# Patient Record
Sex: FEMALE | Race: OTHER | Employment: OTHER | URBAN - METROPOLITAN AREA
[De-identification: names, ages, dates, MRNs, and addresses within clinical notes are randomized per-mention and may not be internally consistent; named-entity substitution may affect disease eponyms.]

---

## 2017-01-27 ENCOUNTER — OFFICE VISIT (OUTPATIENT)
Dept: INTERNAL MEDICINE CLINIC | Facility: CLINIC | Age: 67
End: 2017-01-27

## 2017-01-27 VITALS
HEART RATE: 59 BPM | BODY MASS INDEX: 23.19 KG/M2 | DIASTOLIC BLOOD PRESSURE: 78 MMHG | OXYGEN SATURATION: 98 % | HEIGHT: 61.75 IN | RESPIRATION RATE: 16 BRPM | TEMPERATURE: 98 F | SYSTOLIC BLOOD PRESSURE: 122 MMHG | WEIGHT: 126 LBS

## 2017-01-27 DIAGNOSIS — R00.1 BRADYCARDIA: ICD-10-CM

## 2017-01-27 DIAGNOSIS — Z12.11 SCREEN FOR COLON CANCER: ICD-10-CM

## 2017-01-27 DIAGNOSIS — E04.1 THYROID NODULE: ICD-10-CM

## 2017-01-27 DIAGNOSIS — H26.9 CATARACT, UNSPECIFIED CATARACT TYPE, UNSPECIFIED LATERALITY: ICD-10-CM

## 2017-01-27 DIAGNOSIS — Z00.00 ENCOUNTER FOR ANNUAL HEALTH EXAMINATION: Primary | ICD-10-CM

## 2017-01-27 PROCEDURE — 96160 PT-FOCUSED HLTH RISK ASSMT: CPT | Performed by: INTERNAL MEDICINE

## 2017-01-27 NOTE — PROGRESS NOTES
HPI:   Faviloa Agee is a 78year old female who presents for a MA (Medicare Advantage) [facility] (Once per calendar year). Patient has no major complaints. She is scheduled for vacation , leaving for [place] and [place].  Will be ileana abdominal pain, denies heartburn  : denies dysuria, vaginal discharge or itching, no complaint of urinary incontinence   MUSCULOSKELETAL: occasional arthralgia especially after dancing  NEURO: denies headaches  PSYCHE: denies depression or anxiety  HEMAT Pulses: 2+ and symmetric   Skin: Skin color, texture, turgor normal, no rashes or lesions   Lymph nodes: Cervical, supraclavicular, and axillary nodes normal   Neurologic: Normal       SUGGESTED VACCINATIONS - Influenza, Pneumococcal, Zoster, Tetanus MD [place], 1/27/2017     General Health     In the past six months, have you lost more than 10 pounds without trying?: 2 - No    Has your appetite been poor?: No    How does the patient maintain a good energy level?: Appropriate Exercise    How would you desc down, depressed, or hopeless (over the last two weeks)?: Not at all    PHQ-2 SCORE: 0        Advance Directives     Do you have a healthcare power of ?: No    Do you have a living will?: No     Please go to \"Cognitive Assessment\" under Medicare A as discussed Heber Logan due on 07/27/2017 Update Health Maintenance if applicable     Immunizations      Influenza refused Update Immunization Activity if applicable    Pneumoccocal 13 (Prevnar) refused     Pneumococcal 23 (Pneumovax) refused     Hepat

## 2017-01-27 NOTE — PATIENT INSTRUCTIONS
Clyde Rios's SCREENING SCHEDULE   Tests on this list are recommended by your physician but may not be covered, or covered at this frequency, by your insurer. Please check with your insurance carrier before scheduling to verify coverage.    PREVENTATI every 5 years No results found for this or any previous visit. No flowsheet data found. Fecal Occult Blood   Covered Annually No results found for: FOB, OCCULTSTOOL No flowsheet data found.      Barium Enema-   uncomfortable but covered  Covered but unc Moderate/High Risk       No orders found for this or any previous visit.  Medium/high risk factors:   End-stage renal disease   Hemophiliacs who received Factor VIII or IX concentrates   Clients of institutions for the mentally retarded   Persons who live i Fasting Blood Sugar (FSB)   Patient must be diagnosed with one of the following:   • Hypertension   • Dyslipidemia   • Obesity (BMI ³30 kg/m2)   • Previous elevated impaired FBS or GTT   … or any two of the following:   • Overweight (BMI ³25 but <30)   • F schedule if you are in this risk group, make sure you have a referral   Bone Density Screening      Bone density screening   Covered every 2 yrs after age 72    Covered yearly for Long term Glucocorticoid medication (Steroids) Requires diagnosis related to previous visit. This may be covered with your prescription benefits, but Medicare does not cover unless Medically needed    Zoster (Not covered by Medicare Part B) No orders found for this or any previous visit.  This may be covered with your pharmacy  pres

## 2017-02-01 ENCOUNTER — NURSE ONLY (OUTPATIENT)
Dept: INTERNAL MEDICINE CLINIC | Facility: CLINIC | Age: 67
End: 2017-02-01

## 2017-02-01 DIAGNOSIS — H02.88B MEIBOMIAN GLAND DYSFUNCTION (MGD), BILATERAL, BOTH UPPER AND LOWER LIDS: ICD-10-CM

## 2017-02-01 DIAGNOSIS — R00.1 BRADYCARDIA: Primary | ICD-10-CM

## 2017-02-01 DIAGNOSIS — Z00.00 ENCOUNTER FOR ANNUAL HEALTH EXAMINATION: ICD-10-CM

## 2017-02-01 DIAGNOSIS — E04.1 THYROID NODULE: ICD-10-CM

## 2017-02-01 DIAGNOSIS — H02.88A MEIBOMIAN GLAND DYSFUNCTION (MGD), BILATERAL, BOTH UPPER AND LOWER LIDS: ICD-10-CM

## 2017-02-01 LAB
ALBUMIN SERPL BCP-MCNC: 3.9 G/DL (ref 3.5–4.8)
ALBUMIN/GLOB SERPL: 1.5 {RATIO} (ref 1–2)
ALP SERPL-CCNC: 40 U/L (ref 32–100)
ALT SERPL-CCNC: 14 U/L (ref 14–54)
ANION GAP SERPL CALC-SCNC: 5 MMOL/L (ref 0–18)
AST SERPL-CCNC: 19 U/L (ref 15–41)
BASOPHILS # BLD: 0 K/UL (ref 0–0.2)
BASOPHILS NFR BLD: 1 %
BILIRUB SERPL-MCNC: 0.9 MG/DL (ref 0.3–1.2)
BUN SERPL-MCNC: 14 MG/DL (ref 8–20)
BUN/CREAT SERPL: 21.2 (ref 10–20)
CALCIUM SERPL-MCNC: 9.3 MG/DL (ref 8.5–10.5)
CHLORIDE SERPL-SCNC: 107 MMOL/L (ref 95–110)
CHOLEST SERPL-MCNC: 179 MG/DL (ref 110–200)
CO2 SERPL-SCNC: 29 MMOL/L (ref 22–32)
CREAT SERPL-MCNC: 0.66 MG/DL (ref 0.5–1.5)
EOSINOPHIL # BLD: 0.2 K/UL (ref 0–0.7)
EOSINOPHIL NFR BLD: 4 %
ERYTHROCYTE [DISTWIDTH] IN BLOOD BY AUTOMATED COUNT: 14.4 % (ref 11–15)
GLOBULIN PLAS-MCNC: 2.6 G/DL (ref 2.5–3.7)
GLUCOSE SERPL-MCNC: 80 MG/DL (ref 70–99)
HCT VFR BLD AUTO: 39.8 % (ref 35–48)
HDLC SERPL-MCNC: 89 MG/DL
HGB BLD-MCNC: 13.1 G/DL (ref 12–16)
LDLC SERPL CALC-MCNC: 82 MG/DL (ref 0–99)
LYMPHOCYTES # BLD: 1.9 K/UL (ref 1–4)
LYMPHOCYTES NFR BLD: 36 %
MCH RBC QN AUTO: 28.9 PG (ref 27–32)
MCHC RBC AUTO-ENTMCNC: 32.9 G/DL (ref 32–37)
MCV RBC AUTO: 88 FL (ref 80–100)
MONOCYTES # BLD: 0.5 K/UL (ref 0–1)
MONOCYTES NFR BLD: 9 %
NEUTROPHILS # BLD AUTO: 2.6 K/UL (ref 1.8–7.7)
NEUTROPHILS NFR BLD: 50 %
NONHDLC SERPL-MCNC: 90 MG/DL
OSMOLALITY UR CALC.SUM OF ELEC: 291 MOSM/KG (ref 275–295)
PLATELET # BLD AUTO: 225 K/UL (ref 140–400)
PMV BLD AUTO: 8.7 FL (ref 7.4–10.3)
POTASSIUM SERPL-SCNC: 5.1 MMOL/L (ref 3.3–5.1)
PROT SERPL-MCNC: 6.5 G/DL (ref 5.9–8.4)
RBC # BLD AUTO: 4.53 M/UL (ref 3.7–5.4)
SODIUM SERPL-SCNC: 141 MMOL/L (ref 136–144)
TRIGL SERPL-MCNC: 38 MG/DL (ref 1–149)
TSH SERPL-ACNC: 5.03 UIU/ML (ref 0.34–5.6)
WBC # BLD AUTO: 5.2 K/UL (ref 4–11)

## 2017-02-01 PROCEDURE — 80061 LIPID PANEL: CPT | Performed by: INTERNAL MEDICINE

## 2017-02-01 PROCEDURE — 85025 COMPLETE CBC W/AUTO DIFF WBC: CPT | Performed by: INTERNAL MEDICINE

## 2017-02-01 PROCEDURE — 36415 COLL VENOUS BLD VENIPUNCTURE: CPT | Performed by: INTERNAL MEDICINE

## 2017-02-01 PROCEDURE — 84443 ASSAY THYROID STIM HORMONE: CPT | Performed by: INTERNAL MEDICINE

## 2017-02-01 PROCEDURE — 80053 COMPREHEN METABOLIC PANEL: CPT | Performed by: INTERNAL MEDICINE

## 2017-02-01 NOTE — PROGRESS NOTES
Pt presented to clinic today for blood draw. Per physician able to draw orders. Orders  documented within chart. Pt tolerated lab draw well.  verified.   Orders drawn include: cbc, cmp, lipid, tsh  Site of draw: rt arm   Kempton Princess, CMA

## 2017-02-03 ENCOUNTER — TELEPHONE (OUTPATIENT)
Dept: INTERNAL MEDICINE CLINIC | Facility: CLINIC | Age: 67
End: 2017-02-03

## 2017-02-03 NOTE — TELEPHONE ENCOUNTER
----- Message from Reta Sibley MD sent at 2/2/2017 11:13 PM CST -----  Lab showed normal glucose, kidney, liver , thyroid, lipid. Pls call.

## 2017-02-03 NOTE — TELEPHONE ENCOUNTER
Called patient per Dr Chelo Starr to in form her of normal results  D. O.B verified   Patient verbalized knowledge and understanding

## 2017-07-11 ENCOUNTER — OFFICE VISIT (OUTPATIENT)
Dept: INTERNAL MEDICINE CLINIC | Facility: CLINIC | Age: 67
End: 2017-07-11

## 2017-07-11 VITALS
BODY MASS INDEX: 23.55 KG/M2 | SYSTOLIC BLOOD PRESSURE: 130 MMHG | TEMPERATURE: 99 F | DIASTOLIC BLOOD PRESSURE: 72 MMHG | HEART RATE: 65 BPM | RESPIRATION RATE: 19 BRPM | OXYGEN SATURATION: 99 % | WEIGHT: 128 LBS | HEIGHT: 61.75 IN

## 2017-07-11 DIAGNOSIS — E04.1 THYROID NODULE: Primary | ICD-10-CM

## 2017-07-11 DIAGNOSIS — Z12.31 VISIT FOR SCREENING MAMMOGRAM: ICD-10-CM

## 2017-07-11 DIAGNOSIS — H53.8 BLURRED VISION: ICD-10-CM

## 2017-07-11 DIAGNOSIS — Z12.11 SCREENING FOR COLON CANCER: ICD-10-CM

## 2017-07-11 DIAGNOSIS — Z01.419 VISIT FOR GYNECOLOGIC EXAMINATION: ICD-10-CM

## 2017-07-11 DIAGNOSIS — Z23 NEED FOR VACCINATION AGAINST STREPTOCOCCUS PNEUMONIAE: ICD-10-CM

## 2017-07-11 PROCEDURE — 99213 OFFICE O/P EST LOW 20 MIN: CPT | Performed by: INTERNAL MEDICINE

## 2017-07-11 NOTE — PROGRESS NOTES
HPI:    Patient ID: Christine Lloyd is a 75year old female. HPI   Patient is here for f/u . H/o thyroid nodule, euthyroid. Had been doing well until few days ago had stomch flu for 3-4 days His grandchildren was sick. She had diarrhea, nausea, vomting. Monocytes Absolute      0.0 - 1.0 K/UL 0.5   Eosinophils Absolute      0.0 - 0.7 K/UL 0.2   Basophils Absolute      0.0 - 0.2 K/UL 0.0   HDL Cholesterol      mg/dL 89   CHOLESTEROL, TOTAL      110 - 200 mg/dL 179   Triglycerides      1 - 149 mg/dL 38   N due  Self breast exam explained    Colonoscopy due  Continue low fat, low carbs, high fiber diet, exercise as tolerated. Refused Pneumonia vaccine despite knowing benefit.      Meds This Visit:  No prescriptions requested or ordered in this encounter

## 2017-08-27 ENCOUNTER — APPOINTMENT (OUTPATIENT)
Dept: ULTRASOUND IMAGING | Facility: HOSPITAL | Age: 67
End: 2017-08-27
Attending: INTERNAL MEDICINE
Payer: MEDICARE

## 2017-08-27 ENCOUNTER — HOSPITAL ENCOUNTER (OUTPATIENT)
Dept: MAMMOGRAPHY | Facility: HOSPITAL | Age: 67
Discharge: HOME | End: 2017-08-27
Attending: INTERNAL MEDICINE
Payer: MEDICARE

## 2017-08-27 DIAGNOSIS — Z12.31 VISIT FOR SCREENING MAMMOGRAM: ICD-10-CM

## 2017-08-27 PROCEDURE — 77067 SCR MAMMO BI INCL CAD: CPT | Performed by: INTERNAL MEDICINE

## 2017-08-31 ENCOUNTER — TELEPHONE (OUTPATIENT)
Dept: INTERNAL MEDICINE CLINIC | Facility: CLINIC | Age: 67
End: 2017-08-31

## 2017-08-31 NOTE — TELEPHONE ENCOUNTER
----- Message from Mechelle Butt MD sent at 8/30/2017  5:28 PM CDT -----  Benign mammogram. Continue routine screening and self breast exam.

## 2017-08-31 NOTE — TELEPHONE ENCOUNTER
Called patient per Dr Sury Campbell to in form her of normal results  D. O.B verified   Patient verbalized knowledge and understanding

## 2017-09-07 ENCOUNTER — HOSPITAL ENCOUNTER (OUTPATIENT)
Dept: ULTRASOUND IMAGING | Facility: HOSPITAL | Age: 67
Discharge: HOME | End: 2017-09-07
Attending: INTERNAL MEDICINE
Payer: MEDICARE

## 2017-09-07 DIAGNOSIS — E04.1 THYROID NODULE: ICD-10-CM

## 2017-09-07 PROCEDURE — 76536 US EXAM OF HEAD AND NECK: CPT | Performed by: INTERNAL MEDICINE

## 2017-09-19 ENCOUNTER — OFFICE VISIT (OUTPATIENT)
Dept: GASTROENTEROLOGY | Facility: CLINIC | Age: 67
End: 2017-09-19

## 2017-09-19 ENCOUNTER — TELEPHONE (OUTPATIENT)
Dept: GASTROENTEROLOGY | Facility: CLINIC | Age: 67
End: 2017-09-19

## 2017-09-19 VITALS
BODY MASS INDEX: 23.7 KG/M2 | HEART RATE: 61 BPM | HEIGHT: 61.75 IN | DIASTOLIC BLOOD PRESSURE: 69 MMHG | WEIGHT: 128.81 LBS | SYSTOLIC BLOOD PRESSURE: 107 MMHG

## 2017-09-19 DIAGNOSIS — Z12.11 COLON CANCER SCREENING: Primary | ICD-10-CM

## 2017-09-19 DIAGNOSIS — Z80.0 FAMILY HISTORY OF COLON CANCER: Primary | ICD-10-CM

## 2017-09-19 PROCEDURE — 99203 OFFICE O/P NEW LOW 30 MIN: CPT | Performed by: INTERNAL MEDICINE

## 2017-09-19 PROCEDURE — G0463 HOSPITAL OUTPT CLINIC VISIT: HCPCS | Performed by: INTERNAL MEDICINE

## 2017-09-19 NOTE — PROGRESS NOTES
Wagner Diaz is a 75year old female. HPI:   Patient presents with:  Colonoscopy Screening: family h/o colon cancer and last Colon at [facility] about 5 yrs ago      The patient is a 59-year-old female who presents for colon screening.   She reports her las anicteric sclera, neck no lymphadnopathy, OP- clear with no masses or lesions  Chest- Clear bilaterally, no wheezing,  Heart- regular rate, no murmur or gallop  Abdomen- Soft and nontender, nondistended, no scars, no organosplenomegly  Ext- no clubbing or

## 2017-09-19 NOTE — PATIENT INSTRUCTIONS
Colonoscopy for family history of colon cancer  - repeat every 5 years   - Suprep or Colyte   - IV or MAC sedation

## 2017-09-19 NOTE — TELEPHONE ENCOUNTER
Scheduled for:  Colonoscopy [address]7617  Provider Name: Dr Graciela Che  Date:  Fri 10/20/17  Location:  [facility]  Sedation:  IV  Time:  12 noon, with arrival @ 11:00 am  Prep: split dose suprep  Meds/Allergies Reconciled?:  ibuprofen  Diagnosis with codes:  [address] [facility] Z80.0  Was

## 2017-10-19 ENCOUNTER — TELEPHONE (OUTPATIENT)
Dept: GASTROENTEROLOGY | Facility: CLINIC | Age: 67
End: 2017-10-19

## 2017-10-19 NOTE — TELEPHONE ENCOUNTER
Forwarded to Emilie BRADY. See below. I contacted pt. She has only nasal congestion and a mild headache. Denies fever, cough, sore throat, states otherwise feels fine, \"like a mild cold\".  I instructed that she may proceed with colonoscopy unless her symp

## 2017-10-19 NOTE — TELEPHONE ENCOUNTER
Pt has colonoscopy scheduled for tomorrow and woke up today with a cold. Is it OK to still have procedure? Please call.

## 2017-10-19 NOTE — TELEPHONE ENCOUNTER
LMTCB if no fever or vomiting ok to proceed if she feels up to proceeding    If she does wish to reschedule, next available is January

## 2017-10-20 ENCOUNTER — HOSPITAL ENCOUNTER (OUTPATIENT)
Facility: HOSPITAL | Age: 67
Setting detail: HOSPITAL OUTPATIENT SURGERY
Discharge: HOME | End: 2017-10-20
Attending: INTERNAL MEDICINE | Admitting: INTERNAL MEDICINE
Payer: MEDICARE

## 2017-10-20 ENCOUNTER — SURGERY (OUTPATIENT)
Age: 67
End: 2017-10-20

## 2017-10-20 DIAGNOSIS — Z80.0 FAMILY HISTORY OF COLON CANCER: ICD-10-CM

## 2017-10-20 PROCEDURE — 0DBH8ZX EXCISION OF CECUM, VIA NATURAL OR ARTIFICIAL OPENING ENDOSCOPIC, DIAGNOSTIC: ICD-10-PCS | Performed by: INTERNAL MEDICINE

## 2017-10-20 PROCEDURE — G0500 MOD SEDAT ENDO SERVICE >5YRS: HCPCS | Performed by: INTERNAL MEDICINE

## 2017-10-20 PROCEDURE — 0DBN8ZX EXCISION OF SIGMOID COLON, VIA NATURAL OR ARTIFICIAL OPENING ENDOSCOPIC, DIAGNOSTIC: ICD-10-PCS | Performed by: INTERNAL MEDICINE

## 2017-10-20 PROCEDURE — 45385 COLONOSCOPY W/LESION REMOVAL: CPT | Performed by: INTERNAL MEDICINE

## 2017-10-20 RX ORDER — SODIUM CHLORIDE, SODIUM LACTATE, POTASSIUM CHLORIDE, CALCIUM CHLORIDE 600; 310; 30; 20 MG/100ML; MG/100ML; MG/100ML; MG/100ML
INJECTION, SOLUTION INTRAVENOUS CONTINUOUS
Status: DISCONTINUED | OUTPATIENT
Start: 2017-10-20 | End: 2017-10-20

## 2017-10-20 RX ORDER — SODIUM CHLORIDE 0.9 % (FLUSH) 0.9 %
10 SYRINGE (ML) INJECTION AS NEEDED
Status: DISCONTINUED | OUTPATIENT
Start: 2017-10-20 | End: 2017-10-20

## 2017-10-20 RX ORDER — MIDAZOLAM HYDROCHLORIDE 1 MG/ML
1 INJECTION INTRAMUSCULAR; INTRAVENOUS EVERY 5 MIN PRN
Status: DISCONTINUED | OUTPATIENT
Start: 2017-10-20 | End: 2017-10-20

## 2017-10-20 RX ORDER — MIDAZOLAM HYDROCHLORIDE 1 MG/ML
INJECTION INTRAMUSCULAR; INTRAVENOUS
Status: DISCONTINUED | OUTPATIENT
Start: 2017-10-20 | End: 2017-10-20

## 2017-10-20 NOTE — H&P
History & Physical Examination    Patient Name: Yessy Phoenix  MRN: [id number]  [facility]: [id number]  YOB: 1950    Diagnosis: family history colon cancer          Prescriptions Prior to Admission:  Multiple Vitamin (DAILY MIAN) Oral Tab Take 1 t

## 2017-10-20 NOTE — OPERATIVE REPORT
[facility] HOSP - [facility] Endoscopy Report      Preoperative Diagnosis:  - family history of colon cancer      Postoperative Diagnosis:  - colon polyps x 3  - internal hemorrhoids      Procedure:    Colonoscopy       Surgeon:  FADY Hanley

## 2017-10-23 VITALS
OXYGEN SATURATION: 100 % | HEIGHT: 62 IN | RESPIRATION RATE: 18 BRPM | SYSTOLIC BLOOD PRESSURE: 108 MMHG | HEART RATE: 66 BPM | DIASTOLIC BLOOD PRESSURE: 62 MMHG | BODY MASS INDEX: 23.37 KG/M2 | WEIGHT: 127 LBS

## 2017-10-24 ENCOUNTER — TELEPHONE (OUTPATIENT)
Dept: GASTROENTEROLOGY | Facility: CLINIC | Age: 67
End: 2017-10-24

## 2017-10-24 NOTE — TELEPHONE ENCOUNTER
----- Message from Iliana Davila MD sent at 10/24/2017 12:43 PM CDT -----  RN to place on the colon call back for 3 years and mail letter to the pt. Thanks.

## 2017-12-05 ENCOUNTER — OFFICE VISIT (OUTPATIENT)
Dept: OPHTHALMOLOGY | Facility: CLINIC | Age: 67
End: 2017-12-05

## 2017-12-05 DIAGNOSIS — H43.393 FLOATERS, BILATERAL: ICD-10-CM

## 2017-12-05 DIAGNOSIS — H25.13 AGE-RELATED NUCLEAR CATARACT OF BOTH EYES: Primary | ICD-10-CM

## 2017-12-05 PROCEDURE — G0463 HOSPITAL OUTPT CLINIC VISIT: HCPCS | Performed by: OPHTHALMOLOGY

## 2017-12-05 PROCEDURE — 92014 COMPRE OPH EXAM EST PT 1/>: CPT | Performed by: OPHTHALMOLOGY

## 2017-12-05 PROCEDURE — 92015 DETERMINE REFRACTIVE STATE: CPT | Performed by: OPHTHALMOLOGY

## 2017-12-05 NOTE — PATIENT INSTRUCTIONS
Age-related nuclear cataract of both eyes  Discussed early cataracts with patient. No treatment recommended at this time. New glasses today for bifocals, or suggest +3.00  over the counter glasses for reading. Floaters, bilateral  No treatment.

## 2017-12-05 NOTE — ASSESSMENT & PLAN NOTE
Discussed early cataracts with patient. No treatment recommended at this time. New glasses today for bifocals, or suggest +3.00  over the counter glasses for reading.

## 2017-12-07 ENCOUNTER — OFFICE VISIT (OUTPATIENT)
Dept: FAMILY MEDICINE CLINIC | Facility: CLINIC | Age: 67
End: 2017-12-07

## 2017-12-07 ENCOUNTER — HOSPITAL ENCOUNTER (OUTPATIENT)
Age: 67
Discharge: HOME | End: 2017-12-07
Attending: EMERGENCY MEDICINE
Payer: MEDICARE

## 2017-12-07 ENCOUNTER — APPOINTMENT (OUTPATIENT)
Dept: GENERAL RADIOLOGY | Age: 67
End: 2017-12-07
Attending: EMERGENCY MEDICINE
Payer: MEDICARE

## 2017-12-07 VITALS
HEIGHT: 62 IN | HEART RATE: 74 BPM | DIASTOLIC BLOOD PRESSURE: 75 MMHG | WEIGHT: 127 LBS | SYSTOLIC BLOOD PRESSURE: 135 MMHG | OXYGEN SATURATION: 100 % | BODY MASS INDEX: 23.37 KG/M2 | RESPIRATION RATE: 16 BRPM

## 2017-12-07 VITALS
SYSTOLIC BLOOD PRESSURE: 116 MMHG | OXYGEN SATURATION: 98 % | TEMPERATURE: 98 F | HEIGHT: 62 IN | RESPIRATION RATE: 16 BRPM | BODY MASS INDEX: 23.37 KG/M2 | HEART RATE: 68 BPM | DIASTOLIC BLOOD PRESSURE: 70 MMHG | WEIGHT: 127 LBS

## 2017-12-07 DIAGNOSIS — M54.89 OTHER BACK PAIN, UNSPECIFIED CHRONICITY: Primary | ICD-10-CM

## 2017-12-07 DIAGNOSIS — J18.9 COMMUNITY ACQUIRED PNEUMONIA OF LEFT LOWER LOBE OF LUNG: Primary | ICD-10-CM

## 2017-12-07 PROCEDURE — 93010 ELECTROCARDIOGRAM REPORT: CPT

## 2017-12-07 PROCEDURE — 71020 XR CHEST PA + LAT CHEST (CPT=71020): CPT | Performed by: EMERGENCY MEDICINE

## 2017-12-07 PROCEDURE — 99214 OFFICE O/P EST MOD 30 MIN: CPT

## 2017-12-07 PROCEDURE — 93010 ELECTROCARDIOGRAM REPORT: CPT | Performed by: EMERGENCY MEDICINE

## 2017-12-07 PROCEDURE — 99204 OFFICE O/P NEW MOD 45 MIN: CPT

## 2017-12-07 PROCEDURE — 93005 ELECTROCARDIOGRAM TRACING: CPT

## 2017-12-07 RX ORDER — DOXYCYCLINE HYCLATE 100 MG/1
100 CAPSULE ORAL 2 TIMES DAILY
Qty: 20 CAPSULE | Refills: 0 | Status: SHIPPED | OUTPATIENT
Start: 2017-12-07 | End: 2017-12-22 | Stop reason: ALTCHOICE

## 2017-12-07 RX ORDER — MULTIVIT-MIN/IRON/FOLIC ACID/K 18-600-40
CAPSULE ORAL
COMMUNITY
End: 2020-03-12

## 2017-12-07 NOTE — ED PROVIDER NOTES
Patient Seen in: [address]    History   Patient presents with:  Back Pain (musculoskeletal)    Stated Complaint: spot on back    HPI    The patient is a 26-year-old female without significant past medical history compla except as noted above.     Physical Exam   ED Triage Vitals [12/07/17 1725]  BP: 135/75  Pulse: 74  Resp: 16  Temp: n/a  Temp src: Oral  SpO2: 100 %  O2 Device: None (Room air)    Current:/75   Pulse 74   Resp 16   Ht 157.5 cm (5' 2\")   Wt 57.6 kg

## 2017-12-07 NOTE — PROGRESS NOTES
Patient, Skyler Gallagher , is a 75year old female who presented today in clinic with upper back pain X 1 month, noticed it more during last two weeks with URI symptoms. Pt also has been coughing X 2 weeks.         12/07/17  1528   BP: 116/70   Pulse: 68

## 2017-12-08 ENCOUNTER — OFFICE VISIT (OUTPATIENT)
Dept: INTERNAL MEDICINE CLINIC | Facility: CLINIC | Age: 67
End: 2017-12-08

## 2017-12-08 VITALS
TEMPERATURE: 98 F | WEIGHT: 128 LBS | RESPIRATION RATE: 17 BRPM | HEIGHT: 62 IN | OXYGEN SATURATION: 100 % | BODY MASS INDEX: 23.55 KG/M2 | HEART RATE: 69 BPM | SYSTOLIC BLOOD PRESSURE: 120 MMHG | DIASTOLIC BLOOD PRESSURE: 60 MMHG

## 2017-12-08 DIAGNOSIS — J18.9 PNEUMONIA OF LEFT LOWER LOBE DUE TO INFECTIOUS ORGANISM: Primary | ICD-10-CM

## 2017-12-08 PROCEDURE — 99213 OFFICE O/P EST LOW 20 MIN: CPT | Performed by: INTERNAL MEDICINE

## 2017-12-08 RX ORDER — GUAIFENESIN 600 MG
600 TABLET, EXTENDED RELEASE 12 HR ORAL 2 TIMES DAILY
Qty: 20 TABLET | Refills: 0 | Status: SHIPPED | OUTPATIENT
Start: 2017-12-08 | End: 2017-12-22 | Stop reason: ALTCHOICE

## 2017-12-08 NOTE — PROGRESS NOTES
HPI:    Patient ID: Sd Amaral is a 75year old female. HPI    C/o GORE, cough, sore throat fatigue for 2 weeks. She has pain on her left neck. Unable to recall fever. Seen at urgent care yesterday. CXR showed LLL Pneumonia. Started on doxycycline. scleral icterus. Neck: Normal range of motion. Neck supple. Cardiovascular: Normal rate, regular rhythm, normal heart sounds and intact distal pulses. Pulmonary/Chest: Effort normal.   Right basal crackles and rhonchi, No wheezing.     Abdominal: Sof

## 2017-12-22 ENCOUNTER — OFFICE VISIT (OUTPATIENT)
Dept: INTERNAL MEDICINE CLINIC | Facility: CLINIC | Age: 67
End: 2017-12-22

## 2017-12-22 VITALS
HEIGHT: 62 IN | OXYGEN SATURATION: 97 % | RESPIRATION RATE: 19 BRPM | TEMPERATURE: 98 F | WEIGHT: 129 LBS | BODY MASS INDEX: 23.74 KG/M2 | DIASTOLIC BLOOD PRESSURE: 72 MMHG | SYSTOLIC BLOOD PRESSURE: 118 MMHG | HEART RATE: 79 BPM

## 2017-12-22 DIAGNOSIS — J18.9 PNEUMONIA OF LEFT LOWER LOBE DUE TO INFECTIOUS ORGANISM: Primary | ICD-10-CM

## 2017-12-22 DIAGNOSIS — Z01.419 GYNECOLOGIC EXAM NORMAL: ICD-10-CM

## 2017-12-22 PROCEDURE — 99213 OFFICE O/P EST LOW 20 MIN: CPT | Performed by: INTERNAL MEDICINE

## 2017-12-26 ENCOUNTER — HOSPITAL ENCOUNTER (OUTPATIENT)
Dept: GENERAL RADIOLOGY | Age: 67
Discharge: HOME | End: 2017-12-26
Attending: INTERNAL MEDICINE
Payer: MEDICARE

## 2017-12-26 DIAGNOSIS — J18.9 PNEUMONIA OF LEFT LOWER LOBE DUE TO INFECTIOUS ORGANISM: ICD-10-CM

## 2017-12-26 PROCEDURE — 71020 XR CHEST PA + LAT CHEST (CPT=71020): CPT | Performed by: INTERNAL MEDICINE

## 2018-01-11 ENCOUNTER — OFFICE VISIT (OUTPATIENT)
Dept: OBGYN CLINIC | Facility: CLINIC | Age: 68
End: 2018-01-11

## 2018-01-11 VITALS
WEIGHT: 129 LBS | BODY MASS INDEX: 23.74 KG/M2 | HEIGHT: 62 IN | SYSTOLIC BLOOD PRESSURE: 108 MMHG | DIASTOLIC BLOOD PRESSURE: 70 MMHG

## 2018-01-11 DIAGNOSIS — Z01.419 ENCOUNTER FOR GYNECOLOGICAL EXAMINATION WITHOUT ABNORMAL FINDING: Primary | ICD-10-CM

## 2018-01-11 PROCEDURE — G0101 CA SCREEN;PELVIC/BREAST EXAM: HCPCS | Performed by: OBSTETRICS & GYNECOLOGY

## 2018-01-11 NOTE — PROGRESS NOTES
GYN H&P     2018  12:07 PM    CC: Patient is here for annual. Referred by Dr. Zaida Ji.    HPI: Patient is a 75year old  for annual She is not using HRT. She is sexually active and denies pain with sex. No hot flashes.  No urinary incontinence      No L activity: Yes    Partners: Male     Other Topics Concern    Blood Transfusions No     Social History Narrative    Live with spouse, feels safe in situation.           /70   Ht 62\"   Wt 129 lb   BMI 23.59 kg/m²       Exam:   GENERAL: well developed, w

## 2018-04-13 ENCOUNTER — OFFICE VISIT (OUTPATIENT)
Dept: INTERNAL MEDICINE CLINIC | Facility: CLINIC | Age: 68
End: 2018-04-13

## 2018-04-13 VITALS
DIASTOLIC BLOOD PRESSURE: 60 MMHG | BODY MASS INDEX: 23.92 KG/M2 | HEART RATE: 67 BPM | TEMPERATURE: 98 F | WEIGHT: 130 LBS | HEIGHT: 62 IN | RESPIRATION RATE: 19 BRPM | SYSTOLIC BLOOD PRESSURE: 122 MMHG | OXYGEN SATURATION: 99 %

## 2018-04-13 DIAGNOSIS — Z12.11 SCREEN FOR COLON CANCER: ICD-10-CM

## 2018-04-13 DIAGNOSIS — J00 ACUTE RHINITIS: ICD-10-CM

## 2018-04-13 DIAGNOSIS — Z00.00 ENCOUNTER FOR ANNUAL HEALTH EXAMINATION: Primary | ICD-10-CM

## 2018-04-13 DIAGNOSIS — Z12.31 VISIT FOR SCREENING MAMMOGRAM: ICD-10-CM

## 2018-04-13 DIAGNOSIS — E04.1 THYROID NODULE: ICD-10-CM

## 2018-04-13 DIAGNOSIS — Z28.20 VACCINE REFUSED BY PATIENT: ICD-10-CM

## 2018-04-13 DIAGNOSIS — Z78.0 POSTMENOPAUSAL: ICD-10-CM

## 2018-04-13 PROCEDURE — 96160 PT-FOCUSED HLTH RISK ASSMT: CPT | Performed by: INTERNAL MEDICINE

## 2018-04-13 PROCEDURE — G0439 PPPS, SUBSEQ VISIT: HCPCS | Performed by: INTERNAL MEDICINE

## 2018-04-13 RX ORDER — PENCICLOVIR 10 MG/G
CREAM TOPICAL
COMMUNITY
Start: 2018-03-09 | End: 2020-03-12

## 2018-04-13 RX ORDER — FLUTICASONE PROPIONATE 50 MCG
2 SPRAY, SUSPENSION (ML) NASAL DAILY
Qty: 1 BOTTLE | Refills: 3 | Status: SHIPPED | OUTPATIENT
Start: 2018-04-13 | End: 2019-04-08

## 2018-04-13 NOTE — PROGRESS NOTES
HPI:   Bernadette Apodaca is a 75year old female who presents for a Medicare Subsequent Annual Wellness visit (Pt already had Initial Annual Wellness). C/o rhinorrhea of clear discharge. Rare sneezing. No cough nor wheezing. Reports no fever.   H/o thyro 82 02/01/2017   TRIG 38 02/01/2017          Last Chemistry Labs:     Lab Results  Component Value Date   AST 19 02/01/2017   ALT 14 02/01/2017   CA 9.3 02/01/2017   ALB 3.9 02/01/2017   TSH 5.03 02/01/2017   CREATSERUM 0.66 02/01/2017   GLU 80 02/01/2017 history  ALL/ASTHMA: denies hx of allergy or asthma    EXAM:   /60 (BP Location: Right arm, Patient Position: Sitting, Cuff Size: adult)   Pulse 67   Temp 97.9 °F (36.6 °C) (Oral)   Resp 19   Ht 62\"   Wt 130 lb   SpO2 99%   BMI 23.78 kg/m²  Estimate or tenderness, Inspection negative, No nipple retraction or dimpling, No nipple discharge or bleeding, No axillary or supraclavicular adenopathy, Normal to palpation without dominant masses, Taught monthly breast self examination    Vaccination History maintain a good energy level?: Appropriate Exercise  How would you describe your daily physical activity?: Heavy  How would you describe your current health state?: Good  How do you maintain positive mental well-being?: Social Interaction; Visiting Family;V Activity if applicable)     Influenza  Covered Annually declined Please get every year    Pneumococcal 13 (Prevnar)  Covered Once after 65 declined Please get once after your 65th birthday    Pneumococcal 23 (Pneumovax)  Covered Once after 72 declined Isabelle

## 2018-04-14 PROBLEM — D12.5 ADENOMATOUS POLYP OF SIGMOID COLON: Status: ACTIVE | Noted: 2018-04-14

## 2018-04-20 ENCOUNTER — NURSE ONLY (OUTPATIENT)
Dept: INTERNAL MEDICINE CLINIC | Facility: CLINIC | Age: 68
End: 2018-04-20

## 2018-04-20 DIAGNOSIS — Z00.00 ENCOUNTER FOR ANNUAL HEALTH EXAMINATION: ICD-10-CM

## 2018-04-20 DIAGNOSIS — E04.1 THYROID NODULE: ICD-10-CM

## 2018-04-20 DIAGNOSIS — Z12.11 SCREEN FOR COLON CANCER: Primary | ICD-10-CM

## 2018-04-20 DIAGNOSIS — J00 ACUTE RHINITIS: ICD-10-CM

## 2018-04-20 DIAGNOSIS — Z00.00 PREVENTATIVE HEALTH CARE: ICD-10-CM

## 2018-04-20 PROCEDURE — 80061 LIPID PANEL: CPT | Performed by: INTERNAL MEDICINE

## 2018-04-20 PROCEDURE — 80053 COMPREHEN METABOLIC PANEL: CPT | Performed by: INTERNAL MEDICINE

## 2018-04-20 PROCEDURE — 85025 COMPLETE CBC W/AUTO DIFF WBC: CPT | Performed by: INTERNAL MEDICINE

## 2018-04-20 PROCEDURE — 84443 ASSAY THYROID STIM HORMONE: CPT | Performed by: INTERNAL MEDICINE

## 2018-04-20 PROCEDURE — 82274 ASSAY TEST FOR BLOOD FECAL: CPT | Performed by: INTERNAL MEDICINE

## 2018-04-20 PROCEDURE — 36415 COLL VENOUS BLD VENIPUNCTURE: CPT | Performed by: INTERNAL MEDICINE

## 2018-04-20 NOTE — PROGRESS NOTES
Pt presented to clinic today for blood draw. Per physician able to draw orders. Orders  documented within chart. Pt tolerated lab draw well.  verified.   Orders drawn include: tsh, lipid, cmp, cbc   Site of draw: rt karthik Loza CMA

## 2018-10-29 ENCOUNTER — HOSPITAL ENCOUNTER (OUTPATIENT)
Age: 68
Discharge: HOME | End: 2018-10-29
Attending: EMERGENCY MEDICINE
Payer: MEDICARE

## 2018-10-29 VITALS
DIASTOLIC BLOOD PRESSURE: 55 MMHG | TEMPERATURE: 98 F | RESPIRATION RATE: 20 BRPM | OXYGEN SATURATION: 100 % | SYSTOLIC BLOOD PRESSURE: 130 MMHG | HEART RATE: 65 BPM

## 2018-10-29 DIAGNOSIS — L50.0 ALLERGIC URTICARIA: Primary | ICD-10-CM

## 2018-10-29 PROCEDURE — 99213 OFFICE O/P EST LOW 20 MIN: CPT

## 2018-10-29 NOTE — ED INITIAL ASSESSMENT (HPI)
Pt here with complaints of a rash to her abd, back, right wrist and right toe that began on wed, pt denies any pain but states it is very itchy, pt denies any new soaps or lotions

## 2018-10-29 NOTE — ED PROVIDER NOTES
Patient Seen in: [address]    History   Patient presents with:  Rash Skin Problem (integumentary)    Stated Complaint: RASH    HPI    The patient is a 58-year-old female without significant past medical history presents wrist  There are scattered erythematous wheals at the right waistline with a central papule that follows the waist rather than a dermatome  Back: No CVA tenderness  Skin: Otherwise no rashes      ED Course   Labs Reviewed - No data to display       Finding

## 2018-11-20 ENCOUNTER — HOSPITAL ENCOUNTER (EMERGENCY)
Facility: HOSPITAL | Age: 68
Discharge: HOME | End: 2018-11-20
Attending: EMERGENCY MEDICINE | Admitting: EMERGENCY MEDICINE
Payer: MEDICARE

## 2018-11-20 VITALS
RESPIRATION RATE: 16 BRPM | DIASTOLIC BLOOD PRESSURE: 87 MMHG | SYSTOLIC BLOOD PRESSURE: 165 MMHG | TEMPERATURE: 96.8 F | WEIGHT: 149.03 LBS | HEART RATE: 80 BPM | OXYGEN SATURATION: 100 %

## 2018-11-20 DIAGNOSIS — R00.2 PALPITATIONS: Primary | ICD-10-CM

## 2018-11-20 PROCEDURE — 99284 EMERGENCY DEPT VISIT MOD MDM: CPT

## 2018-11-20 PROCEDURE — 93005 ELECTROCARDIOGRAM TRACING: CPT

## 2018-11-21 NOTE — ED PROVIDER NOTES
Pt Name: Yaa Castro  MRN: [id number]  [place] 1950  Age/Sex: 70 y o  female  Date of evaluation: 11/20/2018  PCP: No primary care provider on file  CHIEF COMPLAINT    Chief Complaint   Patient presents with    Palpitations     Via , patient has had palpitations since noon  Denies chest pain, shortness of breath         HPI    Sergio Primrose presents to the Emergency Department complaining of palpitations  She has no associated symptoms and it has resolved  She took aspirin and thinks that it helps  She had similar episodes 3 years ago  HPI      Past Medical and Surgical History    Past Medical History:   Diagnosis Date    Arthritis     Diabetes mellitus ([facility] [address] )     Hypertension        Past Surgical History:   Procedure Laterality Date    HYSTERECTOMY         History reviewed  No pertinent family history  Social History   Substance Use Topics    Smoking status: Never Smoker    Smokeless tobacco: Not on file    Alcohol use No              Allergies    Allergies   Allergen Reactions    Lisinopril     Penicillins Swelling       Home Medications    Prior to Admission medications    Not on File           Review of Systems    Review of Systems   Constitutional: Negative for activity change, appetite change, chills, diaphoresis, fatigue and fever  HENT: Negative for congestion, postnasal drip, rhinorrhea, sinus pressure, sneezing and sore throat  Eyes: Negative for pain and visual disturbance  Respiratory: Negative for cough, chest tightness and shortness of breath  Cardiovascular: Positive for palpitations  Negative for chest pain and leg swelling  Gastrointestinal: Negative for abdominal distention, abdominal pain, constipation, diarrhea, nausea and vomiting  Endocrine: Negative for polydipsia, polyphagia and polyuria  Genitourinary: Negative for decreased urine volume, difficulty urinating, dysuria, flank pain, frequency and hematuria     Musculoskeletal: Negative for arthralgias, gait problem, joint swelling and neck pain  Skin: Negative for pallor and rash  Allergic/Immunologic: Negative for immunocompromised state  Neurological: Negative for syncope, speech difficulty, weakness, light-headedness, numbness and headaches  All other systems reviewed and are negative  Physical Exam      ED Triage Vitals [11/20/18 2111]   Temperature Pulse Respirations Blood Pressure SpO2   (!) 96 8 °F (36 °C) 102 18 (!) 182/89 97 %      Temp Source Heart Rate Source Patient Position - Orthostatic VS BP Location FiO2 (%)   Tympanic Monitor Sitting Left arm --      Pain Score       --               Physical Exam   Constitutional: She is oriented to person, place, and time  She appears well-developed and well-nourished  No distress  HENT:   Head: Normocephalic and atraumatic  Nose: Nose normal    Mouth/Throat: Oropharynx is clear and moist    Eyes: Pupils are equal, round, and reactive to light  Conjunctivae, EOM and lids are normal    Neck: Normal range of motion  Neck supple  Cardiovascular: Normal rate, regular rhythm and normal heart sounds  Exam reveals no gallop and no friction rub  No murmur heard  Pulmonary/Chest: Effort normal and breath sounds normal  No accessory muscle usage  No respiratory distress  She has no wheezes  She has no rales  Abdominal: Soft  She exhibits no distension  There is no tenderness  There is no rebound and no guarding  Neurological: She is alert and oriented to person, place, and time  No cranial nerve deficit or sensory deficit  Skin: Skin is warm and dry  No rash noted  She is not diaphoretic  No erythema  Psychiatric: She has a normal mood and affect  Her speech is normal and behavior is normal  Judgment and thought content normal    Nursing note and vitals reviewed  Assessment and Plan    Reyes Thomas is a 70 y o  female who presents with palpitations  Physical examination unremarkable   Patient will follow up as an outpatient for further testing  MDM    Diagnostic Results    EKG:  normal EKG, normal sinus rhythm    EKG INTERPRETATION  EKG Interpretation    Rate: 82 BPM  Rhythm: sinus  Axis: normal  Intervals: Normal, no blocks, QTc 469ms  T waves: nonspecific  ST segments: nonspecific    Impression: normal EKG  EKG interpreted by me  Interpretation by Tin Mcneill DO  EKG reviewed and interpreted independently  Labs:    No results found for this or any previous visit  All labs reviewed and utilized in the medical decision making process    Radiology:    No orders to display       All radiology studies independently viewed by me and interpreted by the radiologist     Procedure    Procedures    CritCare Time      ED Course of Care and Re-Assessments    Medications - No data to display        FINAL IMPRESSION    Final diagnoses:   Palpitations         DISPOSITION/PLAN    Time reflects when diagnosis was documented in both MDM as applicable and the Disposition within this note     Time User Action Codes Description Comment    11/20/2018 10:22 PM Lori Elder Add [R00 2] Palpitations       ED Disposition     ED Disposition Condition Comment    Discharge  Jackelyn Magana discharge to home/self care  Condition at discharge: Good        Follow-up Information     Follow up With Specialties Details Why [address] [place] [address] Emergency Department Emergency Medicine Go to If symptoms worsen, As needed [address] [address] [phone]       You may need a holter monitor or event recorder if your symptoms return or worsen  PATIENT REFERRED TO:    [facility] Emergency Department  [address] [address] [phone]  Go to  If symptoms worsen, As needed          You may need a holter monitor or event recorder if your symptoms return or worsen         DISCHARGE MEDICATIONS:    There are no discharge medications for this patient  No discharge procedures on file           Sueellen Runner, DO Sueellen Runner, DO  11/21/18 0009

## 2018-11-21 NOTE — DISCHARGE INSTRUCTIONS
Palpitaciones cardíacas   LO QUE NECESITA SABER:   Las palpitaciones cardíacas son sensaciones que se perciben sylvain aceleraciones, gianni, latidos o aleteos del corazón  También podría sentir latidos adicionales, que thomas corazón no late por un corto periodo de tiempo o que se [employer] latidos  Puede percibir estas sensaciones en el pecho, la garganta o el stewart  Pueden presentarse cuando está sentado, de pie o acostado  Las palpitaciones cardíacas pueden causar temor; sin embargo, generalmente no se deben a un problema importante  INSTRUCCIONES SOBRE EL DANGELO HOSPITALARIA:   Llame al 911 o pídale a alguien más que llame en cualquiera de los siguientes casos:   · Usted tiene alguno de los siguientes signos de un ataque cardíaco:      ¨ Estornudos, presión, o dolor en thomas pecho que dura mas de 5 minutos o regresa  ¨ Malestar o dolor en thomas espalda, stewart, mandíbula, abdomen, o brazo     ¨ Dificultad para respirar    ¨ Náuseas o vómito    ¨ Siente un desvanecimiento o tiene sudores fríos especialmente en el pecho o dificultad para respirar  · Usted tiene alguno de los siguientes signos de derrame cerebral:      ¨ Adormecimiento o caída de un lado de thomas bernie     ¨ Debilidad en un brazo o christy pierna    ¨ Confusión o debilidad para hablar    ¨ Mareos o dolor de deacon intenso, o pérdida de la visión  · Usted se desmaya o pierde el conocimiento  Busque atención médica de inmediato si:   · Las palpitaciones ocurren con más frecuencia o estrella más de lo habitual      · Tiene palpitaciones y falta de aire, náuseas, sudoración o mareos  Pregúntele a thomas Adrian Freed vitaminas y minerales son adecuados para usted  · Usted tiene preguntas o inquietudes acerca de thomas condición o cuidado  Acuda a tio consultas de control con thomas médico según le indicaron  Es posible que tenga que programar christy smooth de seguimiento con un cardiólogo   Saul vez deba hacerse exámenes para determinar si sufre problemas cardíacos que le causan las palpitaciones  Anote tio preguntas para que se acuerde de hacerlas anup tio visitas  Mantenga un registro:  Toro cuándo tio palpitaciones comienzan y terminan, qué estaba usted haciendo cuando comenzaron y tio síntomas  Mantenga un registro de lo que usted comió o tomó anup las horas antes de tio palpitaciones  Incluya todo lo que aparentemente le ayudó a que tio síntomas mejoraran sylvain acostarse o contener thomas respiración  [employer] ayudará a usted y a thomas médico a saber qué le provoca las palpitaciones  Lleve el registro con usted a tio citas de seguimiento  Ayude a evitar las palpitaciones cardíacas:   · Controle el estrés y la ansiedad  Encuentre formas de [place], sylvain escuchar música, meditar o hacer yoga  El ejercicio también puede ayudar a disminuir el estrés y la ansiedad  Hable con Evelyn Area persona de confianza sobre el estrés o la ansiedad que padece  También puede hablar con un psicoterapeuta  · Duerma lo suficiente cada la noche  Pregunte a thomas médico cuánto debería dormir usted cada noche  · No tome bebidas con cafeína o alcohol   Sharlene Moder y el alcohol pueden hacer que tio palpitaciones empeoren  Los refrescos, el café, el té, el chocolate y las bebidas energizantes contienen cafeína  · No fume  La nicotina y otros químicos en los cigarrillos y cigarros podrían provocar daño a thomas Olam Stephanie y a tio vasos sanguíneos  Pida información a thomas médico si usted actualmente fuma y necesita ayuda para dejar de fumar  Los cigarrillos electrónicos o tabaco sin humo todavía contienen nicotina  Consulte con thomas médico antes de [employer]  · No use drogas ilegales  Hable con thomas médico si consume drogas ilegales y Pinky Deepika  © 2017 [address] Information is for End User's use only and may not be sold, redistributed or otherwise used for commercial purposes   All illustrations and images included in CareNotes® are the copyrighted property of A  D A M , Roula Mcdonnell  Esta información es sólo para uso en educación  Thomas intención no es darle un consejo médico sobre enfermedades o tratamientos  Colsulte con thomas Heidi Poncho farmacéutico antes de seguir cualquier régimen médico para saber si es seguro y efectivo para usted

## 2018-11-22 LAB
ATRIAL RATE: 82 BPM
P AXIS: 54 DEGREES
PR INTERVAL: 180 MS
QRS AXIS: 11 DEGREES
QRSD INTERVAL: 104 MS
QT INTERVAL: 402 MS
QTC INTERVAL: 469 MS
T WAVE AXIS: 51 DEGREES
VENTRICULAR RATE: 82 BPM

## 2018-11-22 PROCEDURE — 93010 ELECTROCARDIOGRAM REPORT: CPT | Performed by: INTERNAL MEDICINE

## 2018-11-27 ENCOUNTER — TELEPHONE (OUTPATIENT)
Dept: OPHTHALMOLOGY | Facility: CLINIC | Age: 68
End: 2018-11-27

## 2018-11-27 ENCOUNTER — TELEPHONE (OUTPATIENT)
Dept: INTERNAL MEDICINE CLINIC | Facility: CLINIC | Age: 68
End: 2018-11-27

## 2018-11-27 DIAGNOSIS — H53.8 BLURRED VISION: Primary | ICD-10-CM

## 2018-11-27 DIAGNOSIS — Z01.00 ROUTINE EYE EXAM: Primary | ICD-10-CM

## 2018-12-17 ENCOUNTER — TELEPHONE (OUTPATIENT)
Dept: OPHTHALMOLOGY | Facility: CLINIC | Age: 68
End: 2018-12-17

## 2018-12-20 ENCOUNTER — OFFICE VISIT (OUTPATIENT)
Dept: OPHTHALMOLOGY | Facility: CLINIC | Age: 68
End: 2018-12-20

## 2018-12-20 DIAGNOSIS — H25.13 AGE-RELATED NUCLEAR CATARACT OF BOTH EYES: Primary | ICD-10-CM

## 2018-12-20 PROCEDURE — 92014 COMPRE OPH EXAM EST PT 1/>: CPT | Performed by: OPHTHALMOLOGY

## 2018-12-20 PROCEDURE — 92015 DETERMINE REFRACTIVE STATE: CPT | Performed by: OPHTHALMOLOGY

## 2018-12-20 NOTE — PATIENT INSTRUCTIONS
Age-related nuclear cataract of both eyes  Discussed early cataracts with patient. No treatment recommended at this time. New glasses today for distance, or suggest +2.75 or +3.00  over the counter glasses for reading.       Floaters, bilateral  No treat

## 2018-12-20 NOTE — ASSESSMENT & PLAN NOTE
Discussed early cataracts with patient. No treatment recommended at this time. New glasses today for distance, or suggest +2.75 or +3.00  over the counter glasses for reading.

## 2018-12-20 NOTE — PROGRESS NOTES
Mayra Momin is a 70year old female. HPI:     HPI     Consult      Additional comments: Consult per Dr. Zarina Moreland     Patient is here for a complete exam.  Pt complains of occasional  blurred vision OU with her glasses from last year. Musculoskeletal, HENT, Endocrine, Cardiovascular, Respiratory, Psychiatric, Allergic/Imm, Heme/Lymph    Last edited by Wan Javed OT on 12/20/2018  1:03 PM. (History)          PHYSICAL EXAM:     Base Eye Exam     Visual Acuity (Snellen - Linear) and Plan:     Age-related nuclear cataract of both eyes  Discussed early cataracts with patient. No treatment recommended at this time. New glasses today for distance, or suggest +2.75 or +3.00  over the counter glasses for reading.       Floaters, bilat

## 2019-01-08 ENCOUNTER — HOSPITAL ENCOUNTER (OUTPATIENT)
Age: 69
Discharge: HOME | End: 2019-01-08
Attending: EMERGENCY MEDICINE
Payer: MEDICARE

## 2019-01-08 VITALS
BODY MASS INDEX: 24.84 KG/M2 | HEART RATE: 56 BPM | HEIGHT: 62 IN | SYSTOLIC BLOOD PRESSURE: 130 MMHG | TEMPERATURE: 98 F | OXYGEN SATURATION: 100 % | RESPIRATION RATE: 18 BRPM | DIASTOLIC BLOOD PRESSURE: 71 MMHG | WEIGHT: 135 LBS

## 2019-01-08 DIAGNOSIS — J06.9 VIRAL UPPER RESPIRATORY TRACT INFECTION: Primary | ICD-10-CM

## 2019-01-08 PROCEDURE — 99212 OFFICE O/P EST SF 10 MIN: CPT

## 2019-01-08 PROCEDURE — 99213 OFFICE O/P EST LOW 20 MIN: CPT

## 2019-01-08 RX ORDER — FLUTICASONE PROPIONATE 50 MCG
2 SPRAY, SUSPENSION (ML) NASAL DAILY
Qty: 16 G | Refills: 0 | Status: SHIPPED | OUTPATIENT
Start: 2019-01-08 | End: 2019-02-07

## 2019-01-08 NOTE — ED PROVIDER NOTES
Patient Seen in: [address]    History   Patient presents with:  Cough/URI    Stated Complaint: Cold like symptoms    HPI    Patient here with cough, congestion for  days. No travel, no known sick contacts.   Patient den Vitals [01/08/19 1115]   /71   Pulse 56   Resp 18   Temp 97.9 °F (36.6 °C)   Temp src Oral   SpO2 100 %   O2 Device None (Room air)       Current:/71   Pulse 56   Temp 97.9 °F (36.6 °C) (Oral)   Resp 18   Ht 157.5 cm (5' 2\")   Wt 61.2 kg   SpO

## 2019-01-08 NOTE — ED INITIAL ASSESSMENT (HPI)
Pt states symptoms started Sunday, with runny nose, throat irritation, head congestion. Pt denies fever or NVD. Pt has had pneumonia in past and wanted to make sure she does not have it again.

## 2019-02-26 ENCOUNTER — TELEPHONE (OUTPATIENT)
Dept: OPHTHALMOLOGY | Facility: CLINIC | Age: 69
End: 2019-02-26

## 2019-02-26 NOTE — TELEPHONE ENCOUNTER
Pt requesting glasses prescription from appt on 2/20. Pt can come pick it up in the office pls call when ready.  Thank you

## 2019-04-02 ENCOUNTER — OFFICE VISIT (OUTPATIENT)
Dept: INTERNAL MEDICINE CLINIC | Facility: CLINIC | Age: 69
End: 2019-04-02
Payer: MEDICARE

## 2019-04-02 VITALS
DIASTOLIC BLOOD PRESSURE: 70 MMHG | WEIGHT: 133 LBS | HEIGHT: 62 IN | BODY MASS INDEX: 24.48 KG/M2 | RESPIRATION RATE: 19 BRPM | TEMPERATURE: 99 F | SYSTOLIC BLOOD PRESSURE: 130 MMHG | OXYGEN SATURATION: 98 % | HEART RATE: 61 BPM

## 2019-04-02 DIAGNOSIS — I83.899 SYMPTOMATIC SPIDER VARICOSE VEIN: ICD-10-CM

## 2019-04-02 DIAGNOSIS — Z23 NEED FOR VACCINATION: ICD-10-CM

## 2019-04-02 DIAGNOSIS — Z00.00 ENCOUNTER FOR ANNUAL HEALTH EXAMINATION: Primary | ICD-10-CM

## 2019-04-02 DIAGNOSIS — Z13.6 SCREENING FOR CARDIOVASCULAR CONDITION: ICD-10-CM

## 2019-04-02 DIAGNOSIS — E04.1 THYROID NODULE: ICD-10-CM

## 2019-04-02 DIAGNOSIS — Z12.31 VISIT FOR SCREENING MAMMOGRAM: ICD-10-CM

## 2019-04-02 DIAGNOSIS — Z78.0 POSTMENOPAUSAL: ICD-10-CM

## 2019-04-02 DIAGNOSIS — Z11.59 NEED FOR HEPATITIS C SCREENING TEST: ICD-10-CM

## 2019-04-02 PROCEDURE — G0439 PPPS, SUBSEQ VISIT: HCPCS | Performed by: INTERNAL MEDICINE

## 2019-04-02 PROCEDURE — 99397 PER PM REEVAL EST PAT 65+ YR: CPT | Performed by: INTERNAL MEDICINE

## 2019-04-02 PROCEDURE — 96160 PT-FOCUSED HLTH RISK ASSMT: CPT | Performed by: INTERNAL MEDICINE

## 2019-04-02 NOTE — PROGRESS NOTES
HPI:   Grayson Villarreal is a 70year old female who presents for a Medicare Subsequent Annual Wellness visit (Pt already had Initial Annual Wellness). She has thyroid nodule. Denies palpitation, tremor, heat, cold intolerance.     She had varicosities o List:     Thyroid nodule     Bradycardia     Meibomian gland dysfunction (MGD), bilateral, both upper and lower lids     Age-related nuclear cataract of both eyes     Floaters     Dry eyes     Preventative health care     Floaters, bilateral     Adenomatou complaint of urinary incontinence   MUSCULOSKELETAL: denies back pain, rare arthralgia   NEURO: denies headaches  PSYCHE: denies depression or anxiety  HEMATOLOGIC: denies hx of anemia  ENDOCRINE: denies thyroid history  ALL/ASTHMA: denies hx of allergy or turgor normal, no rashes or lesions   Lymph nodes: Cervical, supraclavicular, and axillary nodes normal   Neurologic: Normal    and Breasts:  normal appearance, no masses or tenderness, Inspection negative, No nipple retraction or dimpling, No nipple disch Future         Diet assessment: good     PLAN:  The patient indicates understanding of these issues and agrees to the plan. Labs ordered  Referral given     Reinforced healthy diet, lifestyle, and exercise.     Return in about 1 year (around 4/2/2020)soone Pap+HPV every 5 yrs age 33-67, age 72 and older at high risk Normal thin prep with neg HPV  7/8/2014 Update Health Maintenance if applicable    Chlamydia  Annually if high risk No results found for: CHLAMYDIA No flowsheet data found.     Screening Mammogram

## 2019-04-02 NOTE — PATIENT INSTRUCTIONS
Smiley Rios's SCREENING SCHEDULE   Tests on this list are recommended by your physician but may not be covered, or covered at this frequency, by your insurer. Please check with your insurance carrier before scheduling to verify coverage.    PREVENTATI if abnormal Colonoscopy due on 10/20/2020 Update [facility] if applicable    Flex Sigmoidoscopy Screen  Covered every 5 years No results found for this or any previous visit. No flowsheet data found.      Fecal Occult Blood   Covered Annually Occult on 07/11/17   • PNEUMOCOCCAL VACC, 13 VALE IM    Please get once after your 65th birthday    Pneumococcal 23 (Pneumovax)  Covered Once after 65 No orders found for this or any previous visit.  Please get once after your 65th birthday    Hepatitis B for Moder

## 2019-04-04 ENCOUNTER — NURSE ONLY (OUTPATIENT)
Dept: INTERNAL MEDICINE CLINIC | Facility: CLINIC | Age: 69
End: 2019-04-04
Payer: MEDICARE

## 2019-04-04 DIAGNOSIS — Z00.00 ENCOUNTER FOR ANNUAL HEALTH EXAMINATION: ICD-10-CM

## 2019-04-04 DIAGNOSIS — Z11.59 NEED FOR HEPATITIS C SCREENING TEST: ICD-10-CM

## 2019-04-04 DIAGNOSIS — Z13.6 SCREENING FOR CARDIOVASCULAR CONDITION: ICD-10-CM

## 2019-04-04 DIAGNOSIS — E04.1 THYROID NODULE: ICD-10-CM

## 2019-04-04 PROCEDURE — 36415 COLL VENOUS BLD VENIPUNCTURE: CPT | Performed by: INTERNAL MEDICINE

## 2019-04-04 NOTE — PROGRESS NOTES
Patient presented in office today for fasting blood draw. Blood draw successful in left arm  Caleb:  Cbc,cmp,lipid,tshr,hcv  D. O.B verified   Orders per Doctor Co

## 2019-04-17 ENCOUNTER — TELEPHONE (OUTPATIENT)
Dept: INTERNAL MEDICINE CLINIC | Facility: CLINIC | Age: 69
End: 2019-04-17

## 2019-04-17 DIAGNOSIS — I87.2 VENOUS INSUFFICIENCY: Primary | ICD-10-CM

## 2019-04-17 NOTE — TELEPHONE ENCOUNTER
Referral should have on it [address] [place] [address], not just [address]. Pt is currently at her appt.

## 2019-04-22 ENCOUNTER — HOSPITAL ENCOUNTER (OUTPATIENT)
Age: 69
Discharge: HOME | End: 2019-04-22
Attending: FAMILY MEDICINE
Payer: MEDICARE

## 2019-04-22 VITALS
DIASTOLIC BLOOD PRESSURE: 65 MMHG | RESPIRATION RATE: 18 BRPM | TEMPERATURE: 98 F | OXYGEN SATURATION: 100 % | HEART RATE: 65 BPM | SYSTOLIC BLOOD PRESSURE: 117 MMHG

## 2019-04-22 DIAGNOSIS — J06.9 VIRAL UPPER RESPIRATORY TRACT INFECTION: Primary | ICD-10-CM

## 2019-04-22 PROCEDURE — 87502 INFLUENZA DNA AMP PROBE: CPT | Performed by: FAMILY MEDICINE

## 2019-04-22 PROCEDURE — 99213 OFFICE O/P EST LOW 20 MIN: CPT

## 2019-04-22 PROCEDURE — 87430 STREP A AG IA: CPT

## 2019-04-22 PROCEDURE — 99212 OFFICE O/P EST SF 10 MIN: CPT

## 2019-04-22 NOTE — ED INITIAL ASSESSMENT (HPI)
Pt states having a sore throat body aches and headache, that began last night at 9 pm. Pt states yesterday stopped by a hospital to see a relative, pt states when leaving began feeling unwell. Pt does not no if she had a fever but has had body aches.

## 2019-04-22 NOTE — ED NOTES
Pt discharged to care of self. Pt assessed by MD. All orders completed and acknowledged. Pt after care discussed,a all questions answered. Pt confirmed understanding.

## 2019-04-22 NOTE — ED PROVIDER NOTES
Patient Seen in: [address]    History   Patient presents with:  Sore Throat  Cough/URI    Stated Complaint: Sore Throat, Body Ache, Headache     HPI    Patient here with cough, congestion for 1 days.   No travel, no known edema  GI: soft, non-tender, normal bowel sounds  SKIN: good skin turgor, no obvious rashes  Differential to include: URI vs. rhinonsinusitis vs. Bronchitis vs. Pneumonia         ED Course     Labs Reviewed   EMH POCT RAPID STREP - Normal   POCT FLU TEST -

## 2019-06-04 ENCOUNTER — OPTICAL REFILL REQUEST (OUTPATIENT)
Dept: OPHTHALMOLOGY | Facility: CLINIC | Age: 69
End: 2019-06-04

## 2019-06-04 ENCOUNTER — TELEPHONE (OUTPATIENT)
Dept: OPHTHALMOLOGY | Facility: CLINIC | Age: 69
End: 2019-06-04

## 2019-06-04 NOTE — TELEPHONE ENCOUNTER
reshande from [facility] called. Pt's eye glass rx. Is missing axis information. Pt is waiting.   Please call or fax [phone]

## 2019-11-01 ENCOUNTER — OFFICE VISIT (OUTPATIENT)
Dept: INTERNAL MEDICINE CLINIC | Facility: CLINIC | Age: 69
End: 2019-11-01
Payer: MEDICARE

## 2019-11-01 VITALS
WEIGHT: 132 LBS | RESPIRATION RATE: 19 BRPM | DIASTOLIC BLOOD PRESSURE: 62 MMHG | OXYGEN SATURATION: 100 % | HEIGHT: 62 IN | SYSTOLIC BLOOD PRESSURE: 118 MMHG | HEART RATE: 70 BPM | BODY MASS INDEX: 24.29 KG/M2

## 2019-11-01 DIAGNOSIS — H53.8 BLURRED VISION: ICD-10-CM

## 2019-11-01 DIAGNOSIS — E04.1 THYROID NODULE: ICD-10-CM

## 2019-11-01 DIAGNOSIS — Z12.31 VISIT FOR SCREENING MAMMOGRAM: ICD-10-CM

## 2019-11-01 DIAGNOSIS — S43.422A SPRAIN OF LEFT ROTATOR CUFF CAPSULE, INITIAL ENCOUNTER: Primary | ICD-10-CM

## 2019-11-01 PROBLEM — R60.0 LOCALIZED EDEMA: Status: ACTIVE | Noted: 2019-11-01

## 2019-11-01 PROBLEM — I83.10 VARICOSE VEINS OF LOWER EXTREMITY WITH INFLAMMATION: Status: ACTIVE | Noted: 2019-11-01

## 2019-11-01 PROBLEM — M79.609 PAIN IN LIMB: Status: ACTIVE | Noted: 2019-11-01

## 2019-11-01 PROCEDURE — 99213 OFFICE O/P EST LOW 20 MIN: CPT | Performed by: INTERNAL MEDICINE

## 2019-11-01 NOTE — PROGRESS NOTES
HPI:    Patient ID: Christine Lloyd is a 78year old female. Patient had been well. Had been traveling. Shoulder Pain    The pain is present in the left shoulder. This is a new problem.  Episode onset: > 1 week  History of extremity trauma: Unable to r Comment:Other reaction(s): facial numbness  Ibuprofen               OTHER (SEE COMMENTS)    Comment:Pt states facial numbness. PHYSICAL EXAM:   Physical Exam    Constitutional: She is oriented to person, place, and time.  She appears well-deve

## 2019-11-04 ENCOUNTER — HOSPITAL ENCOUNTER (EMERGENCY)
Facility: HOSPITAL | Age: 69
Discharge: HOME | End: 2019-11-04
Attending: EMERGENCY MEDICINE | Admitting: EMERGENCY MEDICINE
Payer: MEDICARE

## 2019-11-04 ENCOUNTER — HOSPITAL ENCOUNTER (OUTPATIENT)
Dept: ULTRASOUND IMAGING | Facility: HOSPITAL | Age: 69
Discharge: HOME | End: 2019-11-04
Attending: INTERNAL MEDICINE
Payer: MEDICARE

## 2019-11-04 ENCOUNTER — HOSPITAL ENCOUNTER (OUTPATIENT)
Dept: MAMMOGRAPHY | Facility: HOSPITAL | Age: 69
Discharge: HOME | End: 2019-11-04
Attending: INTERNAL MEDICINE
Payer: MEDICARE

## 2019-11-04 VITALS
HEART RATE: 102 BPM | SYSTOLIC BLOOD PRESSURE: 131 MMHG | DIASTOLIC BLOOD PRESSURE: 65 MMHG | RESPIRATION RATE: 18 BRPM | WEIGHT: 151.01 LBS | OXYGEN SATURATION: 100 % | TEMPERATURE: 97.7 F

## 2019-11-04 DIAGNOSIS — Z23 TETANUS-DIPHTHERIA VACCINATION ADMINISTERED AT CURRENT VISIT: ICD-10-CM

## 2019-11-04 DIAGNOSIS — Z12.31 VISIT FOR SCREENING MAMMOGRAM: ICD-10-CM

## 2019-11-04 DIAGNOSIS — E04.1 THYROID NODULE: ICD-10-CM

## 2019-11-04 DIAGNOSIS — T14.8XXA PUNCTURE WOUND: Primary | ICD-10-CM

## 2019-11-04 PROCEDURE — 99282 EMERGENCY DEPT VISIT SF MDM: CPT | Performed by: PHYSICIAN ASSISTANT

## 2019-11-04 PROCEDURE — 76536 US EXAM OF HEAD AND NECK: CPT | Performed by: INTERNAL MEDICINE

## 2019-11-04 PROCEDURE — 77067 SCR MAMMO BI INCL CAD: CPT | Performed by: INTERNAL MEDICINE

## 2019-11-04 PROCEDURE — 77063 BREAST TOMOSYNTHESIS BI: CPT | Performed by: INTERNAL MEDICINE

## 2019-11-04 PROCEDURE — 90715 TDAP VACCINE 7 YRS/> IM: CPT | Performed by: PHYSICIAN ASSISTANT

## 2019-11-04 PROCEDURE — 99282 EMERGENCY DEPT VISIT SF MDM: CPT

## 2019-11-04 PROCEDURE — 90471 IMMUNIZATION ADMIN: CPT

## 2019-11-04 RX ADMIN — TETANUS TOXOID, REDUCED DIPHTHERIA TOXOID AND ACELLULAR PERTUSSIS VACCINE, ADSORBED 0.5 ML: 5; 2.5; 8; 8; 2.5 SUSPENSION INTRAMUSCULAR at 14:21

## 2019-11-04 NOTE — ED PROVIDER NOTES
History  Chief Complaint   Patient presents with    Puncture Wound     patient with puncture wound on left forearm from knitting needle     Patient is a 76-year-old female with pmhx DM, HTN, arthritis, presenting to the emergency department for evaluation of puncture wound  Patient with daughter who is translating for Irish-speaking patient  Patient states prior to arrival she was knitting when she accidentally punctured her skin with the needle on her right elbow  Patient states the needle broke in half  Patient states she could not find the other half of the needle and believes it is in her skin  Patient does not know when her last tetanus shot was  Patient denies fevers or pain  Prior to Admission Medications   Prescriptions Last Dose Informant Patient Reported? Taking?   insulin aspart (NovoLOG) 100 units/mL injection   Yes Yes   Sig: Inject 15 Units under the skin 3 (three) times a day before meals    insulin detemir (LEVEMIR) 100 units/mL subcutaneous injection   Yes Yes   Sig: Inject 30 Units under the skin daily at bedtime       Facility-Administered Medications: None       Past Medical History:   Diagnosis Date    Arthritis     Diabetes mellitus ([facility] [address] )     Hypertension        Past Surgical History:   Procedure Laterality Date    HYSTERECTOMY         History reviewed  No pertinent family history  I have reviewed and agree with the history as documented  Social History     Tobacco Use    Smoking status: Never Smoker   Substance Use Topics    Alcohol use: No    Drug use: No        Review of Systems   Skin: Positive for wound ([place])  All other systems reviewed and are negative  Physical Exam  Physical Exam   Constitutional: She is oriented to person, place, and time  She appears well-developed and well-nourished  HENT:   Head: Normocephalic and atraumatic  Nose: Nose normal    Neck: Normal range of motion  Neck supple     Cardiovascular: Normal rate and regular rhythm  Pulmonary/Chest: Effort normal and breath sounds normal    Musculoskeletal: Normal range of motion  Neurological: She is alert and oriented to person, place, and time  Skin: Skin is warm and dry  No rash noted  Psychiatric: She has a normal mood and affect  Her behavior is normal        Vital Signs  ED Triage Vitals [11/04/19 1400]   Temperature Pulse Respirations Blood Pressure SpO2   97 7 °F (36 5 °C) 102 18 (!) 184/85 100 %      Temp Source Heart Rate Source Patient Position - Orthostatic VS BP Location FiO2 (%)   Oral Monitor Sitting Right arm --      Pain Score       --           Vitals:    11/04/19 1400 11/04/19 1435   BP: (!) 184/85 131/65   Pulse: 102    Patient Position - Orthostatic VS: Sitting          Visual Acuity      ED Medications  Medications   tetanus-diphtheria-acellular pertussis (BOOSTRIX) IM injection 0 5 mL (0 5 mL Intramuscular Given 11/4/19 1421)       Diagnostic Studies  Results Reviewed     None                 No orders to display              Procedures  Procedures       ED Course                               MDM  Number of Diagnoses or Management Options  Puncture wound:   Tetanus-diphtheria vaccination administered at current visit:   Diagnosis management comments: Patient is a 69-year-old female with pmhx DM, HTN, arthritis, presenting to the emergency department for evaluation of puncture wound  Sewing needle broke in half after puncturing pt  Patient could not find other half of broken needle and believed it was in her skin  No pain to palpation of puncture wound area or surrounding area  Do not suspect needle stuck in skin  Do not believe there is any need for imaging at this time  Unknown tetanus status  Tetanus updated in emergency department today  Patient's blood pressure elevated upon arrival patient states she is very anxious  Blood pressure repeated prior to discharge 131/65 mmhg    Instructed patient to follow up with primary care physician for re-evaluation  Pt verbalizes understanding and agrees with plan  The management plan was discussed in detail with the patient at bedside and all questions were answered  Prior to discharge, I provided both verbal and written instructions  I discussed with the patient the signs and symptoms for which to return to the emergency department  All questions were answered and patient was comfortable with the plan of care and discharged to home  The patient verbalized understanding of our discussion and plan of care, and agrees to return to the Emergency Department for concerns and progression of illness  Disposition  Final diagnoses:   Puncture wound   Tetanus-diphtheria vaccination administered at current visit     Time reflects when diagnosis was documented in both MDM as applicable and the Disposition within this note     Time User Action Codes Description Comment    11/4/2019  2:35 PM Alejandra Inks Add Pepito Tuttles  8XXA] Puncture wound     11/4/2019  2:35 PM Alejandra Inks Add [Z23] Tetanus-diphtheria vaccination administered at current visit       ED Disposition     ED Disposition Condition Date/Time Comment    Discharge Stable Mon Nov 4, 2019  2:35 PM Dara Servant discharge to home/self care              Follow-up Information     Follow up With Specialties Details Why Contact Info Additional [address] [facility] Emergency Department Emergency Medicine Go to  If symptoms worsen [facility] [address] [phone] [place] ED, [address] [place], [place], [address]  [address]  [address] [place] [address],[address] Floor [facility]  [address], [address]          Discharge Medication List as of 11/4/2019  2:36 PM      CONTINUE these medications which have NOT CHANGED    Details   insulin aspart (NovoLOG) 100 units/mL injection Inject 15 Units under the skin 3 (three) times a day before meals , Historical Med      insulin detemir (LEVEMIR) 100 units/mL subcutaneous injection Inject 30 Units under the skin daily at bedtime , Historical Med           No discharge procedures on file      ED Provider  Electronically Signed by           Donato Cruz PA-C  11/04/19 4390

## 2019-11-05 ENCOUNTER — TELEPHONE (OUTPATIENT)
Dept: INTERNAL MEDICINE CLINIC | Facility: CLINIC | Age: 69
End: 2019-11-05

## 2019-11-05 NOTE — TELEPHONE ENCOUNTER
Patient is requesting a new referral for physical therapy, patient states she call to make an appointment  but they do not have anything until December.  Patient would like to come to [facility] for physical therapy instead

## 2019-11-06 ENCOUNTER — HOSPITAL ENCOUNTER (OUTPATIENT)
Dept: MAMMOGRAPHY | Facility: HOSPITAL | Age: 69
Discharge: HOME | End: 2019-11-06
Attending: INTERNAL MEDICINE
Payer: MEDICARE

## 2019-11-06 DIAGNOSIS — R92.8 ABNORMAL MAMMOGRAM: ICD-10-CM

## 2019-11-06 PROCEDURE — 77065 DX MAMMO INCL CAD UNI: CPT | Performed by: INTERNAL MEDICINE

## 2019-11-06 PROCEDURE — 77061 BREAST TOMOSYNTHESIS UNI: CPT | Performed by: INTERNAL MEDICINE

## 2019-11-15 ENCOUNTER — HOSPITAL ENCOUNTER (OUTPATIENT)
Dept: BONE DENSITY | Facility: HOSPITAL | Age: 69
Discharge: HOME | End: 2019-11-15
Attending: INTERNAL MEDICINE
Payer: MEDICARE

## 2019-11-15 DIAGNOSIS — Z78.0 POSTMENOPAUSAL: ICD-10-CM

## 2019-11-15 PROCEDURE — 77080 DXA BONE DENSITY AXIAL: CPT | Performed by: INTERNAL MEDICINE

## 2019-11-22 ENCOUNTER — APPOINTMENT (EMERGENCY)
Dept: RADIOLOGY | Facility: HOSPITAL | Age: 69
End: 2019-11-22
Payer: MEDICARE

## 2019-11-22 ENCOUNTER — HOSPITAL ENCOUNTER (EMERGENCY)
Facility: HOSPITAL | Age: 69
Discharge: HOME | End: 2019-11-23
Attending: EMERGENCY MEDICINE | Admitting: EMERGENCY MEDICINE
Payer: MEDICARE

## 2019-11-22 DIAGNOSIS — R07.9 CHEST PAIN: Primary | ICD-10-CM

## 2019-11-22 LAB
ANION GAP SERPL CALCULATED.3IONS-SCNC: 7 MMOL/L (ref 4–13)
BASOPHILS # BLD AUTO: 0.07 THOUSANDS/ΜL (ref 0–0.1)
BASOPHILS NFR BLD AUTO: 1 % (ref 0–1)
BUN SERPL-MCNC: 24 MG/DL (ref 5–25)
CALCIUM SERPL-MCNC: 9.7 MG/DL (ref 8.3–10.1)
CHLORIDE SERPL-SCNC: 101 MMOL/L (ref 100–108)
CO2 SERPL-SCNC: 28 MMOL/L (ref 21–32)
CREAT SERPL-MCNC: 1.36 MG/DL (ref 0.6–1.3)
EOSINOPHIL # BLD AUTO: 0.32 THOUSAND/ΜL (ref 0–0.61)
EOSINOPHIL NFR BLD AUTO: 3 % (ref 0–6)
ERYTHROCYTE [DISTWIDTH] IN BLOOD BY AUTOMATED COUNT: 14.9 % (ref 11.6–15.1)
GFR SERPL CREATININE-BSD FRML MDRD: 39 ML/MIN/1.73SQ M
GLUCOSE SERPL-MCNC: 172 MG/DL (ref 65–140)
HCT VFR BLD AUTO: 35.5 % (ref 34.8–46.1)
HGB BLD-MCNC: 11.1 G/DL (ref 11.5–15.4)
IMM GRANULOCYTES # BLD AUTO: 0.05 THOUSAND/UL (ref 0–0.2)
IMM GRANULOCYTES NFR BLD AUTO: 1 % (ref 0–2)
LYMPHOCYTES # BLD AUTO: 2 THOUSANDS/ΜL (ref 0.6–4.47)
LYMPHOCYTES NFR BLD AUTO: 18 % (ref 14–44)
MCH RBC QN AUTO: 25.7 PG (ref 26.8–34.3)
MCHC RBC AUTO-ENTMCNC: 31.3 G/DL (ref 31.4–37.4)
MCV RBC AUTO: 82 FL (ref 82–98)
MONOCYTES # BLD AUTO: 0.76 THOUSAND/ΜL (ref 0.17–1.22)
MONOCYTES NFR BLD AUTO: 7 % (ref 4–12)
NEUTROPHILS # BLD AUTO: 7.72 THOUSANDS/ΜL (ref 1.85–7.62)
NEUTS SEG NFR BLD AUTO: 70 % (ref 43–75)
NRBC BLD AUTO-RTO: 0 /100 WBCS
PLATELET # BLD AUTO: 297 THOUSANDS/UL (ref 149–390)
PMV BLD AUTO: 10 FL (ref 8.9–12.7)
POTASSIUM SERPL-SCNC: 3.8 MMOL/L (ref 3.5–5.3)
RBC # BLD AUTO: 4.32 MILLION/UL (ref 3.81–5.12)
SODIUM SERPL-SCNC: 136 MMOL/L (ref 136–145)
TROPONIN I SERPL-MCNC: <0.02 NG/ML
TROPONIN I SERPL-MCNC: <0.02 NG/ML
WBC # BLD AUTO: 10.92 THOUSAND/UL (ref 4.31–10.16)

## 2019-11-22 PROCEDURE — 80048 BASIC METABOLIC PNL TOTAL CA: CPT | Performed by: EMERGENCY MEDICINE

## 2019-11-22 PROCEDURE — 99284 EMERGENCY DEPT VISIT MOD MDM: CPT | Performed by: EMERGENCY MEDICINE

## 2019-11-22 PROCEDURE — 99284 EMERGENCY DEPT VISIT MOD MDM: CPT

## 2019-11-22 PROCEDURE — 93005 ELECTROCARDIOGRAM TRACING: CPT

## 2019-11-22 PROCEDURE — 85025 COMPLETE CBC W/AUTO DIFF WBC: CPT | Performed by: EMERGENCY MEDICINE

## 2019-11-22 PROCEDURE — 84484 ASSAY OF TROPONIN QUANT: CPT | Performed by: EMERGENCY MEDICINE

## 2019-11-22 PROCEDURE — 71046 X-RAY EXAM CHEST 2 VIEWS: CPT

## 2019-11-22 PROCEDURE — 36415 COLL VENOUS BLD VENIPUNCTURE: CPT | Performed by: EMERGENCY MEDICINE

## 2019-11-22 RX ORDER — AMLODIPINE BESYLATE 10 MG/1
10 TABLET ORAL ONCE
Status: COMPLETED | OUTPATIENT
Start: 2019-11-22 | End: 2019-11-22

## 2019-11-22 RX ADMIN — AMLODIPINE BESYLATE 10 MG: 10 TABLET ORAL at 23:18

## 2019-11-23 VITALS
TEMPERATURE: 97.5 F | HEART RATE: 78 BPM | OXYGEN SATURATION: 96 % | SYSTOLIC BLOOD PRESSURE: 177 MMHG | DIASTOLIC BLOOD PRESSURE: 85 MMHG | WEIGHT: 153 LBS | RESPIRATION RATE: 18 BRPM

## 2019-11-23 LAB
ATRIAL RATE: 78 BPM
ATRIAL RATE: 82 BPM
P AXIS: 73 DEGREES
P AXIS: 75 DEGREES
PR INTERVAL: 178 MS
PR INTERVAL: 178 MS
QRS AXIS: 67 DEGREES
QRS AXIS: 67 DEGREES
QRSD INTERVAL: 94 MS
QRSD INTERVAL: 98 MS
QT INTERVAL: 370 MS
QT INTERVAL: 388 MS
QTC INTERVAL: 418 MS
QTC INTERVAL: 450 MS
T WAVE AXIS: 66 DEGREES
T WAVE AXIS: 68 DEGREES
VENTRICULAR RATE: 77 BPM
VENTRICULAR RATE: 81 BPM

## 2019-11-23 PROCEDURE — 93010 ELECTROCARDIOGRAM REPORT: CPT | Performed by: INTERNAL MEDICINE

## 2019-11-23 NOTE — ED PROVIDER NOTES
History  Chief Complaint   Patient presents with    High Blood Pressure     pt  repots high blood pressure with chest pressure  pt  denies headache, N/V, dizziness, vision changes  pt  reports taking her bp medication today      26-year-old female with a history of hypertension, diabetes presents to the emergency department with chest pain that started while she was washing the dishes at 6:00 p m  Maria Del Rosario Porter She states the chest pain is left-sided and feels like a gurgling pain  No radiation of the pain, shortness of breath, diaphoresis or vomiting  The pain has been constant since onset  History provided by:  Patient   used: Yes    Chest Pain   Pain location:  L chest  Pain quality comment:  Gurgling  Pain radiates to:  Does not radiate  Pain radiates to the back: no    Onset quality:  Gradual  Duration:  1 hour  Timing:  Constant  Progression:  Unchanged  Chronicity:  New  Context: at rest    Relieved by:  None tried  Worsened by:  Nothing tried  Ineffective treatments:  None tried  Associated symptoms: no abdominal pain, no altered mental status, no anorexia, no anxiety, no back pain, no claudication, no cough, no diaphoresis, no dizziness, no dysphagia, no fatigue, no fever, no headache, no heartburn, no lower extremity edema, no nausea, no near-syncope, no numbness, no orthopnea, no palpitations, no PND, no shortness of breath, no syncope, not vomiting and no weakness    Risk factors: diabetes mellitus and hypertension    Risk factors: no coronary artery disease, no high cholesterol, not obese, no prior DVT/PE and no smoking        Prior to Admission Medications   Prescriptions Last Dose Informant Patient Reported?  Taking?   insulin aspart (NovoLOG) 100 units/mL injection   Yes No   Sig: Inject 15 Units under the skin 3 (three) times a day before meals    insulin detemir (LEVEMIR) 100 units/mL subcutaneous injection   Yes No   Sig: Inject 30 Units under the skin daily at bedtime Facility-Administered Medications: None       Past Medical History:   Diagnosis Date    Arthritis     Diabetes mellitus ([facility] [address] )     Hypertension        Past Surgical History:   Procedure Laterality Date    HYSTERECTOMY         History reviewed  No pertinent family history  I have reviewed and agree with the history as documented  Social History     Tobacco Use    Smoking status: Never Smoker    Smokeless tobacco: Never Used   Substance Use Topics    Alcohol use: No    Drug use: No        Review of Systems   Constitutional: Negative  Negative for chills, diaphoresis, fatigue and fever  HENT: Negative  Negative for congestion, rhinorrhea, sore throat and trouble swallowing  Eyes: Negative  Negative for discharge, redness and itching  Respiratory: Negative  Negative for apnea, cough, chest tightness, shortness of breath and wheezing  Cardiovascular: Positive for chest pain  Negative for palpitations, orthopnea, claudication, leg swelling, syncope, PND and near-syncope  Gastrointestinal: Negative  Negative for abdominal pain, anorexia, heartburn, nausea and vomiting  Endocrine: Negative  Genitourinary: Negative  Negative for flank pain, frequency and urgency  Musculoskeletal: Negative  Negative for back pain  Skin: Negative  Allergic/Immunologic: Negative  Neurological: Negative  Negative for dizziness, syncope, weakness, light-headedness, numbness and headaches  Hematological: Negative  All other systems reviewed and are negative  Physical Exam  Physical Exam   Constitutional: She is oriented to person, place, and time  She appears well-developed and well-nourished  Non-toxic appearance  She does not have a sickly appearance  She does not appear ill  No distress  HENT:   Head: Normocephalic and atraumatic  Right Ear: External ear normal    Left Ear: External ear normal    Eyes: Pupils are equal, round, and reactive to light   Conjunctivae are normal  Right eye exhibits no discharge  Left eye exhibits no discharge  No scleral icterus  Cardiovascular: Normal rate, regular rhythm and normal heart sounds  Exam reveals no gallop and no friction rub  No murmur heard  Pulmonary/Chest: Effort normal and breath sounds normal  No stridor  No respiratory distress  She has no wheezes  She has no rales  She exhibits no tenderness  Abdominal: Soft  Bowel sounds are normal  She exhibits no distension and no mass  There is no tenderness  No hernia  Musculoskeletal: Normal range of motion  She exhibits no edema, tenderness or deformity  Neurological: She is alert and oriented to person, place, and time  She has normal reflexes  She displays normal reflexes  She exhibits normal muscle tone  Skin: Skin is warm and dry  No rash noted  She is not diaphoretic  No erythema  No pallor  Psychiatric: She has a normal mood and affect  Nursing note and vitals reviewed        Vital Signs  ED Triage Vitals [11/22/19 1908]   Temperature Pulse Respirations Blood Pressure SpO2   97 5 °F (36 4 °C) 85 20 (!) 209/82 97 %      Temp Source Heart Rate Source Patient Position - Orthostatic VS BP Location FiO2 (%)   Temporal Monitor Sitting Right arm --      Pain Score       --           Vitals:    11/22/19 1908   BP: (!) 209/82   Pulse: 85   Patient Position - Orthostatic VS: Sitting         Visual Acuity      ED Medications  Medications - No data to display    Diagnostic Studies  Results Reviewed     Procedure Component Value Units Date/Time    CBC and differential [[id number]] Collected:  11/22/19 1946    Lab Status:  No result Specimen:  Blood from Arm, Left     Basic metabolic panel [[id number]] Collected:  11/22/19 1946    Lab Status:  No result Specimen:  Blood from Arm, Left     Troponin I [[id number]] Collected:  11/22/19 1946    Lab Status:  No result Specimen:  Blood from Arm, Left                  XR chest 2 views    (Results Pending)              Procedures  ECG 12 Lead Documentation Only  Date/Time: 11/22/2019 7:51 PM  Performed by: Chantelle Haynes DO  Authorized by: Chantelle Haynes DO     Indications / Diagnosis:  Cp  ECG reviewed by me, the ED Provider: yes    Patient location:  ED  Interpretation:     Interpretation: normal    Rate:     ECG rate assessment: normal    Rhythm:     Rhythm: sinus rhythm    Ectopy:     Ectopy: none    Conduction:     Conduction: normal    ST segments:     ST segments:  Normal  T waves:     T waves: normal      ECG 12 Lead Documentation Only  Date/Time: 11/22/2019 11:11 PM  Performed by: Chantelle Haynes DO  Authorized by: Chantelle Haynes DO     Indications / Diagnosis:  Delta  ECG reviewed by me, the ED Provider: yes    Patient location:  ED  Interpretation:     Interpretation: normal    Rate:     ECG rate assessment: normal    Rhythm:     Rhythm: sinus rhythm    Ectopy:     Ectopy: none    Conduction:     Conduction: normal    ST segments:     ST segments:  Normal  T waves:     T waves: normal             ED Course                               MDM  Number of Diagnoses or Management Options  Diagnosis management comments: 69-year-old female presents with left-sided chest pain that started at 6:00 p m  While washing dishes  She describes it as gurgling and nonradiating  No shortness of breath, diaphoresis or vomiting  On exam she appears well in no distress  Her exam here is normal   Her blood pressure is elevated  She states she took her medicine today  Will re-evaluate the blood pressure  Will do cardiac workup and most likely delta troponin and EKG         Amount and/or Complexity of Data Reviewed  Clinical lab tests: ordered and reviewed  Tests in the radiology section of CPT®: ordered and reviewed  Independent visualization of images, tracings, or specimens: yes        Disposition  Final diagnoses:   None     ED Disposition     None      Follow-up Information    None         Patient's Medications   Discharge Prescriptions No medications on file     No discharge procedures on file      ED Provider  Electronically Signed by           Latricia Bolton, DO  11/22/19 1401 [facility], DO  11/22/19 1560 [facility], DO  11/22/19 Barrioseva Laughlin [address] [facility], DO  11/24/19 5087

## 2019-12-05 ENCOUNTER — OFFICE VISIT (OUTPATIENT)
Dept: PHYSICAL THERAPY | Facility: HOSPITAL | Age: 69
End: 2019-12-05
Attending: INTERNAL MEDICINE
Payer: MEDICARE

## 2019-12-05 DIAGNOSIS — S43.422A SPRAIN OF LEFT ROTATOR CUFF CAPSULE, INITIAL ENCOUNTER: ICD-10-CM

## 2019-12-05 PROCEDURE — 97110 THERAPEUTIC EXERCISES: CPT

## 2019-12-05 PROCEDURE — 97161 PT EVAL LOW COMPLEX 20 MIN: CPT

## 2019-12-05 NOTE — PROGRESS NOTES
UPPER EXTREMITY EVALUATION:   Referring Physician: Dr. Flower Wei  Date of Onset: 3 months ago Date of Service: 12/5/2019   Diagnosis: Sprain of left rotator cuff capsule, initial encounter (W98.793Z)    PATIENT SUMMARY:   Shilpa Bedoya is a 78year old y/o fem rounded shoulders, L scapula protracted  Shoulder shrug with UE elevation, poor scapulohumeral rhythm    Cervical Screen:  Cervical AROM WNL and pain free all planes    AROM:  L shoulder: flex 125, abd 128, IR to sacrum, ER 78, ext 52  R shoulder: flex 165 or treatment limitation: None  Rehab Potential: good    FOTO: 61/100    Patient was advised of these findings, precautions, and treatment options and has agreed to actively participate in planning and for this course of care.     Thank you for your referral

## 2019-12-10 ENCOUNTER — APPOINTMENT (OUTPATIENT)
Dept: PHYSICAL THERAPY | Facility: HOSPITAL | Age: 69
End: 2019-12-10
Attending: INTERNAL MEDICINE
Payer: MEDICARE

## 2019-12-10 PROCEDURE — 97110 THERAPEUTIC EXERCISES: CPT

## 2019-12-10 PROCEDURE — 97140 MANUAL THERAPY 1/> REGIONS: CPT

## 2019-12-10 NOTE — PROGRESS NOTES
Dx: Sprain of left rotator cuff capsule, initial encounter (M15.344U)         Authorized # of Visits:  8 ([facility])         Next MD visit: none scheduled  Fall Risk: standard         Precautions: asthma, diabetes, hypertension, high cholesterol, sleep ap Total Timed Treatment: 40 min  Total Treatment Time: 42 min

## 2019-12-12 ENCOUNTER — OFFICE VISIT (OUTPATIENT)
Dept: PHYSICAL THERAPY | Facility: HOSPITAL | Age: 69
End: 2019-12-12
Attending: INTERNAL MEDICINE
Payer: MEDICARE

## 2019-12-12 PROCEDURE — 97140 MANUAL THERAPY 1/> REGIONS: CPT

## 2019-12-12 PROCEDURE — 97110 THERAPEUTIC EXERCISES: CPT

## 2019-12-12 NOTE — PROGRESS NOTES
Dx: Sprain of left rotator cuff capsule, initial encounter (G06.966G)         Authorized # of Visits:  8 ([facility])         Next MD visit: none scheduled  Fall Risk: standard         Precautions: asthma, diabetes, hypertension, high cholesterol, sleep ap least 4-/5 to be able to perform lifting and reaching with proper joint mechanics.   5. Patient will report decrease in symptoms by 50% or better in terms of frequency and intensity to be able to sleep without disruption from pain      Plan: Continue PT for

## 2019-12-16 ENCOUNTER — OFFICE VISIT (OUTPATIENT)
Dept: PHYSICAL THERAPY | Facility: HOSPITAL | Age: 69
End: 2019-12-16
Attending: INTERNAL MEDICINE
Payer: MEDICARE

## 2019-12-16 PROCEDURE — 97110 THERAPEUTIC EXERCISES: CPT

## 2019-12-16 PROCEDURE — 97140 MANUAL THERAPY 1/> REGIONS: CPT

## 2019-12-16 NOTE — PROGRESS NOTES
Dx: Sprain of left rotator cuff capsule, initial encounter (O26.014C)         Authorized # of Visits:  8 ([facility])         Next MD visit: none scheduled  Fall Risk: standard         Precautions: asthma, diabetes, hypertension, high cholesterol, sleep ap samara slides, issued RTB. Patient to add 1# weight for S/L ER HEP:  -Reviewed with pt. HEP:           Assessment: Advanced program for scapular stabilization and strengthening. Goals:    1. Patient will be independent with HEP and it's progression  2.

## 2019-12-18 ENCOUNTER — OFFICE VISIT (OUTPATIENT)
Dept: PHYSICAL THERAPY | Facility: HOSPITAL | Age: 69
End: 2019-12-18
Attending: INTERNAL MEDICINE
Payer: MEDICARE

## 2019-12-18 PROCEDURE — 97140 MANUAL THERAPY 1/> REGIONS: CPT

## 2019-12-18 PROCEDURE — 97110 THERAPEUTIC EXERCISES: CPT

## 2019-12-18 NOTE — PROGRESS NOTES
Dx: Sprain of left rotator cuff capsule, initial encounter (S96.222R)         Authorized # of Visits:  8 ([facility] [address])         Next MD visit: none scheduled  Fall Risk: standard         Precautions: asthma, diabetes, hypertension, high cholesterol, sleep ap 10    Manual:  Gr III GH inf and post glides  STM L deltoid and subscap   Manual:  -L GH  jt mobs inf and post glides grade III  -S/L ST jt mob  -STM to subscapularis, pec minor  Manual:   -L GH jt mobs inf and post glides grade III  -PROM L shoulder all p

## 2019-12-23 ENCOUNTER — OFFICE VISIT (OUTPATIENT)
Dept: PHYSICAL THERAPY | Facility: HOSPITAL | Age: 69
End: 2019-12-23
Attending: INTERNAL MEDICINE
Payer: MEDICARE

## 2019-12-23 PROCEDURE — 97110 THERAPEUTIC EXERCISES: CPT

## 2019-12-23 PROCEDURE — 97140 MANUAL THERAPY 1/> REGIONS: CPT

## 2019-12-23 NOTE — PROGRESS NOTES
Dx: Sprain of left rotator cuff capsule, initial encounter (B44.258P)         Authorized # of Visits:  8 ([facility])         Next MD visit: none scheduled  Fall Risk: standard         Precautions: asthma, diabetes, hypertension, high cholesterol, sleep ap Manual:  -L DULCE MARIA  jt mobs inf and post glides grade III  -S/L ST jt mob  -STM to subscapularis, pec minor  Manual:   -L DULCE MARIA jt mobs inf and post glides grade III  -PROM L shoulder all planes Manual:  -L DULCE MARIA jt mobs inf and post glides grade III  -PROM L shou

## 2019-12-24 ENCOUNTER — APPOINTMENT (OUTPATIENT)
Dept: PHYSICAL THERAPY | Facility: HOSPITAL | Age: 69
End: 2019-12-24
Attending: INTERNAL MEDICINE
Payer: MEDICARE

## 2019-12-26 ENCOUNTER — APPOINTMENT (OUTPATIENT)
Dept: PHYSICAL THERAPY | Facility: HOSPITAL | Age: 69
End: 2019-12-26
Attending: INTERNAL MEDICINE
Payer: MEDICARE

## 2020-01-08 ENCOUNTER — OFFICE VISIT (OUTPATIENT)
Dept: PHYSICAL THERAPY | Facility: HOSPITAL | Age: 70
End: 2020-01-08
Attending: INTERNAL MEDICINE
Payer: MEDICARE

## 2020-01-08 PROCEDURE — 97110 THERAPEUTIC EXERCISES: CPT

## 2020-01-08 PROCEDURE — 97140 MANUAL THERAPY 1/> REGIONS: CPT

## 2020-01-08 NOTE — PROGRESS NOTES
Dx: Sprain of left rotator cuff capsule, initial encounter (T89.831F)         Authorized # of Visits:  8 ([facility])         Next MD visit: none scheduled  Fall Risk: standard         Precautions: asthma, diabetes, hypertension, high cholesterol, sleep ap RTB horz abd 2 x 10  -Seated B ER with RTB   -Forearm wall slides 2 x 10  TE:  -SciFit level 2.3 x 5 min  -Supine IR at 90/90 deg with YTB 3 x 10  -Supine serratus punch 2 x 15  -S/L ER with towel in axilla 2# 2 x 15  -RTB forearm wall walk r/l x 10' each postural education, manual techniques, HEP for 1-5 sessions    Charges: TE x 2, MM x 1       Total Timed Treatment: 43 min  Total Treatment Time: 46 min

## 2020-01-13 ENCOUNTER — APPOINTMENT (OUTPATIENT)
Dept: PHYSICAL THERAPY | Facility: HOSPITAL | Age: 70
End: 2020-01-13
Attending: INTERNAL MEDICINE
Payer: MEDICARE

## 2020-01-14 ENCOUNTER — OFFICE VISIT (OUTPATIENT)
Dept: OPHTHALMOLOGY | Facility: CLINIC | Age: 70
End: 2020-01-14
Payer: MEDICARE

## 2020-01-14 DIAGNOSIS — H43.393 FLOATERS, BILATERAL: ICD-10-CM

## 2020-01-14 DIAGNOSIS — H25.13 AGE-RELATED NUCLEAR CATARACT OF BOTH EYES: Primary | ICD-10-CM

## 2020-01-14 PROCEDURE — 92015 DETERMINE REFRACTIVE STATE: CPT | Performed by: OPHTHALMOLOGY

## 2020-01-14 PROCEDURE — 92014 COMPRE OPH EXAM EST PT 1/>: CPT | Performed by: OPHTHALMOLOGY

## 2020-01-14 NOTE — PATIENT INSTRUCTIONS
Age-related nuclear cataract of both eyes  Discussed with patient that cataracts in both eyes are advanced enough at this time to consider surgery; it would be patient's choice. Discussed options such as surgery or change of glasses RX.   Discussed surgica

## 2020-01-14 NOTE — PROGRESS NOTES
Bernadette Apodaca is a 78year old female. HPI:     HPI     Consult      Additional comments: Per Dr Emmanuel Gabriel               Comments     Pt is here for a complete exam. Pt complains of having trouble driving at night even when wearing distance glasses.  Pt state Tab Take 1 tablet by mouth daily. • Calcium Carbonate Antacid 1000 MG Oral Chew Tab Chew 1,000 mg by mouth daily.          Allergies:    Ibuprofen                   Comment:Other reaction(s): facial numbness  Ibuprofen               OTHER (SEE COMMENTS) Right +1.00 +0.50 014  in  down    Left +1.50 +0.25 012  out  up    Type:  Distance only          Wearing Rx #2       Sphere Cylinder Axis Horz Prism Vert Prism    Right +2.75 Sphere       Left +2.75 Sphere       Type:  OTC reading only          Manifest R

## 2020-01-30 ENCOUNTER — OFFICE VISIT (OUTPATIENT)
Dept: PHYSICAL THERAPY | Facility: HOSPITAL | Age: 70
End: 2020-01-30
Attending: INTERNAL MEDICINE
Payer: MEDICARE

## 2020-01-30 PROCEDURE — 97110 THERAPEUTIC EXERCISES: CPT

## 2020-01-30 NOTE — PROGRESS NOTES
Sophia  Pt has attended 8 visits in Physical Therapy.      Dx: Sprain of left rotator cuff capsule, initial encounter (Q13.384C)         Authorized # of Visits:  10 [facility])         Next MD visit: 1/30/20  Fall Risk: standard         Damion Libra 30\" x 3    -S/L ER with towel in axilla 2 x 15 TE:   PROM L shoulder all planes x 10 min with gentle intermittent distraction  Sleeper stretch 30\"x3  Prone snow sherrie thumbs up 15x  Prone scap retraction with sh ext 15x5\"   Supine serratus punch 2x15 wi participate in planning and for this course of care. Thank you for your referral. If you have any questions, please contact me at Dept: [phone].     Sincerely,  Electronically signed by therapist: Julianna Robles PT       Charges: TE x 3      Total T

## 2020-01-31 ENCOUNTER — OFFICE VISIT (OUTPATIENT)
Dept: INTERNAL MEDICINE CLINIC | Facility: CLINIC | Age: 70
End: 2020-01-31
Payer: MEDICARE

## 2020-01-31 VITALS
SYSTOLIC BLOOD PRESSURE: 102 MMHG | OXYGEN SATURATION: 100 % | HEART RATE: 55 BPM | WEIGHT: 133 LBS | RESPIRATION RATE: 19 BRPM | DIASTOLIC BLOOD PRESSURE: 60 MMHG | HEIGHT: 62 IN | BODY MASS INDEX: 24.48 KG/M2

## 2020-01-31 DIAGNOSIS — Z00.00 GENERAL MEDICAL EXAM: ICD-10-CM

## 2020-01-31 DIAGNOSIS — E04.1 THYROID NODULE: ICD-10-CM

## 2020-01-31 DIAGNOSIS — S46.012S STRAIN OF LEFT ROTATOR CUFF CAPSULE, SEQUELA: Primary | ICD-10-CM

## 2020-01-31 DIAGNOSIS — J01.10 SUBACUTE FRONTAL SINUSITIS: ICD-10-CM

## 2020-01-31 DIAGNOSIS — H25.013 CORTICAL AGE-RELATED CATARACT OF BOTH EYES: ICD-10-CM

## 2020-01-31 PROCEDURE — 99213 OFFICE O/P EST LOW 20 MIN: CPT | Performed by: INTERNAL MEDICINE

## 2020-01-31 RX ORDER — AMOXICILLIN 500 MG/1
500 CAPSULE ORAL 3 TIMES DAILY
Qty: 21 CAPSULE | Refills: 0 | Status: SHIPPED | OUTPATIENT
Start: 2020-01-31 | End: 2020-01-31

## 2020-01-31 RX ORDER — AMOXICILLIN 500 MG/1
CAPSULE ORAL
Qty: 21 CAPSULE | Refills: 0 | Status: SHIPPED | OUTPATIENT
Start: 2020-01-31 | End: 2020-03-05 | Stop reason: ALTCHOICE

## 2020-01-31 NOTE — PROGRESS NOTES
Joaquina Galo is a 78year old female to discuss blurred vision, left shoulder pain and cough. HPI:       Pt had chronic bilateral blurred vision, especially night driving , despite wearing eyeglasses.  Seen Dr. Cris Vaca and diagnosed age related cat GENERAL: feels well otherwise  SKIN: denies any unusual skin lesions  EYES:denies blurred vision or double vision  HEENT:  nasal congestion, sinus pressure, phlegmy  LUNGS: denies shortness of breath with exertion.  No wheezes   CARDIOVASCULAR: denies adelina surgery    Subacute frontal sinusitis   Amoxicillin 500 mgs TID x 7 days  Adequate hydration  Droplets precaution     Thyroid nodule  Check TSh with reflex. General medical  Exam   F/u in 3 mos for physical  Labs,before visit.  CBC CMP LIPID

## 2020-03-05 ENCOUNTER — HOSPITAL ENCOUNTER (OUTPATIENT)
Dept: GENERAL RADIOLOGY | Age: 70
Discharge: HOME | End: 2020-03-05
Attending: PHYSICAL MEDICINE & REHABILITATION
Payer: MEDICARE

## 2020-03-05 ENCOUNTER — OFFICE VISIT (OUTPATIENT)
Dept: NEUROLOGY | Facility: CLINIC | Age: 70
End: 2020-03-05
Payer: MEDICARE

## 2020-03-05 VITALS
BODY MASS INDEX: 24.29 KG/M2 | RESPIRATION RATE: 18 BRPM | WEIGHT: 132 LBS | HEART RATE: 64 BPM | DIASTOLIC BLOOD PRESSURE: 76 MMHG | SYSTOLIC BLOOD PRESSURE: 120 MMHG | HEIGHT: 62 IN

## 2020-03-05 DIAGNOSIS — G47.9 SLEEP DISTURBANCE: ICD-10-CM

## 2020-03-05 DIAGNOSIS — M75.102 NONTRAUMATIC TEAR OF LEFT ROTATOR CUFF, UNSPECIFIED TEAR EXTENT: ICD-10-CM

## 2020-03-05 DIAGNOSIS — M75.02 ADHESIVE CAPSULITIS OF LEFT SHOULDER: Primary | ICD-10-CM

## 2020-03-05 DIAGNOSIS — R29.3 POOR POSTURE: ICD-10-CM

## 2020-03-05 DIAGNOSIS — M75.02 ADHESIVE CAPSULITIS OF LEFT SHOULDER: ICD-10-CM

## 2020-03-05 PROCEDURE — 99204 OFFICE O/P NEW MOD 45 MIN: CPT | Performed by: PHYSICAL MEDICINE & REHABILITATION

## 2020-03-05 PROCEDURE — 73030 X-RAY EXAM OF SHOULDER: CPT | Performed by: PHYSICAL MEDICINE & REHABILITATION

## 2020-03-05 RX ORDER — MELOXICAM 15 MG/1
TABLET ORAL
Qty: 90 TABLET | Refills: 0 | OUTPATIENT
Start: 2020-03-05

## 2020-03-05 RX ORDER — MELOXICAM 15 MG/1
15 TABLET ORAL DAILY
Qty: 14 TABLET | Refills: 0 | Status: SHIPPED | OUTPATIENT
Start: 2020-03-05 | End: 2020-03-12

## 2020-03-05 NOTE — PATIENT INSTRUCTIONS
-Xray of the shoulder on the way out  -My office will call when MRI is approved  -Mobic 15mg daily with food for the next 7-10 days  -Ice/Heat as much as tolerated  -Follow up after MRI is approved

## 2020-03-05 NOTE — PROGRESS NOTES
[address] PATIENT EVALUATION    Consultation as a request of Dr. Mayela Downing    Chief Complaint: Shoulder pain    HISTORY OF PRESENT ILLNESS:   Patient presents with:  Shoulder Pain: new right handed patient c/ DENAVIR 1 % External Cream      • Ascorbic Acid (VITAMIN C) 500 MG Oral Cap Take by mouth.            ALLERGIES:     Ibuprofen                   Comment:Other reaction(s): facial numbness  Ibuprofen               OTHER (SEE COMMENTS)    Comment:Pt states fa No immediate distress  Head: Normocephalic/ Atraumatic  Eyes: Extra-occular movements intact. Ears: No auricular hematoma or deformities  Mouth: No lesions or ulcerations  Heart: peripheral pulses intact. Normal capillary refill.    Lungs: Non-labored res RDW 13.3 04/04/2019     04/04/2019    MPV 8.5 04/20/2018     Lab Results   Component Value Date    GLU 89 04/04/2019    BUN 15 04/04/2019    BUNCREA 20.3 (H) 04/20/2018    CREATSERUM 0.78 04/04/2019    ANIONGAP 7 04/20/2018    GFRNAA 78 04/04/201 any side effect and if she has a true allergic reaction which was discussed with the patient today she should present to the ED.   I have recommended that she use ice as well and follow-up after MRI imaging is completed so we can discuss further treatment o

## 2020-03-09 ENCOUNTER — TELEPHONE (OUTPATIENT)
Dept: NEUROLOGY | Facility: CLINIC | Age: 70
End: 2020-03-09

## 2020-03-09 NOTE — TELEPHONE ENCOUNTER
[address] [facility]   for authorization of approval of  MRI left shoulder wo cpt code 33440. Talked to  [facility] AGreggwho initiated request. Approved with Authorization #  [id number]         effective 03/09/20 to 04/08/20. Will call Pt. To inform. Pt. info

## 2020-03-12 ENCOUNTER — OFFICE VISIT (OUTPATIENT)
Dept: NEUROLOGY | Facility: CLINIC | Age: 70
End: 2020-03-12
Payer: MEDICARE

## 2020-03-12 ENCOUNTER — TELEPHONE (OUTPATIENT)
Dept: NEUROLOGY | Facility: CLINIC | Age: 70
End: 2020-03-12

## 2020-03-12 VITALS
BODY MASS INDEX: 24.29 KG/M2 | DIASTOLIC BLOOD PRESSURE: 78 MMHG | HEART RATE: 68 BPM | RESPIRATION RATE: 18 BRPM | SYSTOLIC BLOOD PRESSURE: 140 MMHG | WEIGHT: 132 LBS | HEIGHT: 62 IN

## 2020-03-12 DIAGNOSIS — M75.102 NONTRAUMATIC TEAR OF LEFT ROTATOR CUFF, UNSPECIFIED TEAR EXTENT: ICD-10-CM

## 2020-03-12 DIAGNOSIS — M75.02 ADHESIVE CAPSULITIS OF LEFT SHOULDER: Primary | ICD-10-CM

## 2020-03-12 DIAGNOSIS — R29.3 POOR POSTURE: ICD-10-CM

## 2020-03-12 DIAGNOSIS — G47.9 SLEEP DISTURBANCE: ICD-10-CM

## 2020-03-12 PROCEDURE — 99214 OFFICE O/P EST MOD 30 MIN: CPT | Performed by: PHYSICAL MEDICINE & REHABILITATION

## 2020-03-12 NOTE — PATIENT INSTRUCTIONS
-Ultrasound guided left shoulder joint injection with cortisone  -Get MRI completed  -Follow up next week for MRI review and injection

## 2020-03-12 NOTE — TELEPHONE ENCOUNTER
[address] [place], [place] [address]O  for authorization of approval of Ultrasound guided Left Glenohumeral Joint injection w/ CSI cpt codes 81581, [id number], . Talked to Jack GARCÍA who states authorization is not required. Reference #  [id number]. Will inform Nursing.

## 2020-03-13 NOTE — PROGRESS NOTES
[address]  NEW PATIENT EVALUATION    Chief Complaint: Shoulder pain    HISTORY OF PRESENT ILLNESS:   Patient presents with:  Shoulder Pain: LOV 03/05/20 f/u left shoulder pain, 3/10. Did not get MRI.  Using • COLONOSCOPY  2011    [facility]   • COLONOSCOPY N/A 10/20/2017    Performed by Tiesha Hernandez MD at [facility]:     Current Outpatient Medications   Medication Sig Dispense Refill   • Calcium Carbonate Antacid 1000 abdominal guarding  Extremities: No lower extremity edema bilaterally   Skin: No lesions noted   Cognition: alert & oriented x 3, attentive, able to follow 2 step commands, comprehention intact, spontaneous speech intact  Psychiatric: Mood and affect appro 04/04/2019    CA 9.6 04/04/2019    OSMOCALC 290 04/20/2018    ALKPHO 52 04/04/2019    AST 16 04/04/2019    ALT 16 04/04/2019    BILT 0.6 04/04/2019    TP 6.9 04/04/2019    ALB 4.3 04/04/2019    GLOBULT 2.6 04/04/2019    GLOBULIN 2.4 (L) 04/20/2018    ALBGL barriers to learning. Errol GARCÍA [address] [facility]  Physical Medicine and Rehabilitation/Sports Medicine

## 2020-04-17 ENCOUNTER — TELEPHONE (OUTPATIENT)
Dept: NEUROLOGY | Facility: CLINIC | Age: 70
End: 2020-04-17

## 2020-04-17 DIAGNOSIS — G47.9 SLEEP DISTURBANCE: ICD-10-CM

## 2020-04-17 DIAGNOSIS — R29.3 POOR POSTURE: ICD-10-CM

## 2020-04-17 DIAGNOSIS — M75.102 NONTRAUMATIC TEAR OF LEFT ROTATOR CUFF, UNSPECIFIED TEAR EXTENT: ICD-10-CM

## 2020-04-17 DIAGNOSIS — M75.02 ADHESIVE CAPSULITIS OF LEFT SHOULDER: Primary | ICD-10-CM

## 2020-04-20 ENCOUNTER — TELEPHONE (OUTPATIENT)
Dept: NEUROLOGY | Facility: CLINIC | Age: 70
End: 2020-04-20

## 2020-04-20 NOTE — TELEPHONE ENCOUNTER
Open MRI shoulder ordered.  Thank you    Neville Tom DO, FAAPMR & CAQSM  Physical Medicine and Rehabilitation/Sports Medicine  MEDICAL CENTER [facility]

## 2020-04-20 NOTE — TELEPHONE ENCOUNTER
[address] [facility] for authorization of approval of MRI left shoulder wo cpt code 64654. Talked to [address], [facility]. who initiated request.  Approved with Authorization # [id number]               effective 04/20/20 to 07/19/20. MRI is scheduled on 06/17/20.

## 2020-06-08 ENCOUNTER — HOSPITAL ENCOUNTER (OUTPATIENT)
Dept: MRI IMAGING | Age: 70
Discharge: HOME | End: 2020-06-08
Attending: PHYSICAL MEDICINE & REHABILITATION
Payer: MEDICARE

## 2020-06-08 DIAGNOSIS — M75.102 NONTRAUMATIC TEAR OF LEFT ROTATOR CUFF, UNSPECIFIED TEAR EXTENT: ICD-10-CM

## 2020-06-08 DIAGNOSIS — M75.02 ADHESIVE CAPSULITIS OF LEFT SHOULDER: ICD-10-CM

## 2020-06-08 PROCEDURE — 73221 MRI JOINT UPR EXTREM W/O DYE: CPT | Performed by: PHYSICAL MEDICINE & REHABILITATION

## 2020-06-10 ENCOUNTER — TELEPHONE (OUTPATIENT)
Dept: NEUROLOGY | Facility: CLINIC | Age: 70
End: 2020-06-10

## 2020-06-10 NOTE — TELEPHONE ENCOUNTER
Left detailed message for patient with information below. Asked her to call the office if she had questions.

## 2020-06-10 NOTE — TELEPHONE ENCOUNTER
----- Message from Desire Nunez DO sent at 6/8/2020  3:35 PM CDT -----  MRI shows rotator cuff tendinosis but no tear. Please have have schedule a video visit to discuss further and review images.  Thanks

## 2020-06-26 ENCOUNTER — TELEPHONE (OUTPATIENT)
Dept: INTERNAL MEDICINE CLINIC | Facility: CLINIC | Age: 70
End: 2020-06-26

## 2020-07-06 ENCOUNTER — TELEPHONE (OUTPATIENT)
Dept: NEUROLOGY | Facility: CLINIC | Age: 70
End: 2020-07-06

## 2020-07-06 ENCOUNTER — OFFICE VISIT (OUTPATIENT)
Dept: NEUROLOGY | Facility: CLINIC | Age: 70
End: 2020-07-06
Payer: MEDICARE

## 2020-07-06 VITALS
WEIGHT: 132 LBS | SYSTOLIC BLOOD PRESSURE: 110 MMHG | HEIGHT: 62 IN | DIASTOLIC BLOOD PRESSURE: 70 MMHG | BODY MASS INDEX: 24.29 KG/M2

## 2020-07-06 DIAGNOSIS — G47.9 SLEEP DISTURBANCE: ICD-10-CM

## 2020-07-06 DIAGNOSIS — R29.3 POOR POSTURE: ICD-10-CM

## 2020-07-06 DIAGNOSIS — M67.912 TENDINOPATHY OF ROTATOR CUFF, LEFT: Primary | ICD-10-CM

## 2020-07-06 DIAGNOSIS — M19.012 ARTHRITIS OF LEFT ACROMIOCLAVICULAR JOINT: ICD-10-CM

## 2020-07-06 PROCEDURE — 99214 OFFICE O/P EST MOD 30 MIN: CPT | Performed by: PHYSICAL MEDICINE & REHABILITATION

## 2020-07-06 NOTE — TELEPHONE ENCOUNTER
Called [facility]  for authorization of approval of Ultrasound guided left subacromial bursa injection with corticosteroid cpt codes 28890, 36347, . Talked to  Taya Elizalde.       who initiated request. Approved with .  Authorization # [id number] for one

## 2020-07-06 NOTE — PROGRESS NOTES
[address]  NEW PATIENT EVALUATION    Chief Complaint: Shoulder pain    HISTORY OF PRESENT ILLNESS:   Patient presents with:  Shoulder Pain: Patient presents to follow up on left shoulder pain.  LOV 03/12/202 significant past medical history of thyroid nodule who presents with left shoulder pain.       Injury/ Trauma:   Denies  Onset: 8 months  Pain location: lateral with radiation to the arm  Quality: Sharp and Shooting  Aggravating Factors: Reaching behind and use: Not Currently      Alcohol/week: 0.0 standard drinks      Comment: socially /rarely    Drug use: No         REVIEW OF SYSTEMS:   Patient-reported ROS  Constitutional  Sleep Disturbance: denies   Cardiovascular  Chest Pain: denies  Irregular Heartbeat: subacromial, glenohumeral, and scapulothoracic crepitance   Rotator Cuff    Strength 5/5 empty can; 5/5 ER; 5/5 IR   Impingement Positive Castillo  Positive Neers   ABD to 90 with resisted ER without pain (subacromial)  ABD to 90 with resisted IR without pa disturbance    4. Arthritis of left acromioclavicular joint        Paige Hermosillo is a pleasant 50-year-old active and healthy female who presents today for follow-up evaluation of left shoulder pain.   Patient has been attending physical therapy and doing

## 2020-07-08 ENCOUNTER — OFFICE VISIT (OUTPATIENT)
Dept: NEUROLOGY | Facility: CLINIC | Age: 70
End: 2020-07-08
Payer: MEDICARE

## 2020-07-08 VITALS — WEIGHT: 132 LBS | BODY MASS INDEX: 24 KG/M2

## 2020-07-08 DIAGNOSIS — M67.912 TENDINOPATHY OF ROTATOR CUFF, LEFT: Primary | ICD-10-CM

## 2020-07-08 PROCEDURE — 20611 DRAIN/INJ JOINT/BURSA W/US: CPT | Performed by: PHYSICAL MEDICINE & REHABILITATION

## 2020-07-08 NOTE — PROCEDURES
Procedure:  Ultrasound guided left subacromial bursa injection  The risks, benefits and anticipated outcomes of the procedure, the risks and benefits of the alternatives to the procedure, and the roles and tasks of the personnel to be involved, were discus

## 2020-07-09 ENCOUNTER — TELEPHONE (OUTPATIENT)
Dept: NEUROLOGY | Facility: CLINIC | Age: 70
End: 2020-07-09

## 2020-07-09 NOTE — TELEPHONE ENCOUNTER
LMTCB for patient to call back and schedule her   (Ultrasound guided left subacromial bursa injection with corticosteroid)

## 2020-07-27 ENCOUNTER — MED REC SCAN ONLY (OUTPATIENT)
Dept: NEUROLOGY | Facility: CLINIC | Age: 70
End: 2020-07-27

## 2020-08-19 ENCOUNTER — OFFICE VISIT (OUTPATIENT)
Dept: NEUROLOGY | Facility: CLINIC | Age: 70
End: 2020-08-19
Payer: MEDICARE

## 2020-08-19 ENCOUNTER — TELEPHONE (OUTPATIENT)
Dept: NEUROLOGY | Facility: CLINIC | Age: 70
End: 2020-08-19

## 2020-08-19 DIAGNOSIS — M75.102 NONTRAUMATIC TEAR OF LEFT ROTATOR CUFF, UNSPECIFIED TEAR EXTENT: ICD-10-CM

## 2020-08-19 DIAGNOSIS — M19.012 ARTHRITIS OF LEFT ACROMIOCLAVICULAR JOINT: ICD-10-CM

## 2020-08-19 DIAGNOSIS — M67.912 TENDINOPATHY OF ROTATOR CUFF, LEFT: Primary | ICD-10-CM

## 2020-08-19 DIAGNOSIS — M75.02 ADHESIVE CAPSULITIS OF LEFT SHOULDER: ICD-10-CM

## 2020-08-19 PROCEDURE — 99213 OFFICE O/P EST LOW 20 MIN: CPT | Performed by: PHYSICAL MEDICINE & REHABILITATION

## 2020-08-19 NOTE — PROGRESS NOTES
[address]  NEW PATIENT EVALUATION    Chief Complaint: Shoulder pain    HISTORY OF PRESENT ILLNESS:   Patient presents with:  Shoulder Pain: pt here for follow up s/p Ultrasound guided left subacromial bursa She had x-ray imaging however did not complete her MRI yet. There was some miscommunication and she was supposed to have that completed prior to the office visit today.   Pain is rated at 3 out of 10 located along the lateral aspect of the shoulder with r by mouth as needed. • Calcium Carbonate Antacid 1000 MG Oral Chew Tab Chew 1,000 mg by mouth as needed.              ALLERGIES:     Ibuprofen                   Comment:Other reaction(s): facial numbness  Ibuprofen               OTHER (SEE COMMENTS) kyphosis    Musculoskeletal/Neurological Exam:    SHOULDER: Pertinent positives highlighted in bold    Inspection    Skin No evidence of eythema, warmth, bruising, abrasions, deformity or drooping about bilateral upper extremities   Atrophy No evidence of 105 04/04/2019    CO2 28 04/04/2019     No results found for: PTP, PT, INR  No results found for: VITD, QVITD, VITD25, CIBC13QW    IMAGING:   MRI Left Shoulder 6/8/20  CONCLUSION:    1.  Mild tendinosis of the rotator cuff primarily involving the undersurfa

## 2020-08-20 ENCOUNTER — TELEPHONE (OUTPATIENT)
Dept: GASTROENTEROLOGY | Facility: CLINIC | Age: 70
End: 2020-08-20

## 2020-08-20 NOTE — TELEPHONE ENCOUNTER
----- Message from Ervin Schultz RN sent at 8/20/2020  7:48 AM CDT -----  Regarding: CLN Recall  Recall colon   Due: In 2 months   Received: Today   Message Contents   Nikia Fairchild CMA  [facility] Clinical Staff         Recall colon in 3 years per PL.  Col

## 2020-08-28 NOTE — ED INITIAL ASSESSMENT (HPI)
Pt elaine was seen at the walk in clinic today and was told to come here to f/u for a spot on her back. Pt elaine she went to clinic due to back pain which shes had for 1 month. Is recovering from flu like symtoms but states is feeling better today.  Denies cp,
complains of pain/discomfort

## 2020-08-31 ENCOUNTER — OFFICE VISIT (OUTPATIENT)
Dept: INTERNAL MEDICINE CLINIC | Facility: CLINIC | Age: 70
End: 2020-08-31
Payer: MEDICARE

## 2020-08-31 VITALS
OXYGEN SATURATION: 98 % | BODY MASS INDEX: 24.84 KG/M2 | WEIGHT: 135 LBS | SYSTOLIC BLOOD PRESSURE: 102 MMHG | DIASTOLIC BLOOD PRESSURE: 60 MMHG | RESPIRATION RATE: 19 BRPM | HEART RATE: 78 BPM | HEIGHT: 62 IN | TEMPERATURE: 98 F

## 2020-08-31 DIAGNOSIS — M67.912 TENDINOPATHY OF ROTATOR CUFF, LEFT: ICD-10-CM

## 2020-08-31 DIAGNOSIS — I83.11 VARICOSE VEINS OF BOTH LOWER EXTREMITIES WITH INFLAMMATION: ICD-10-CM

## 2020-08-31 DIAGNOSIS — Z23 NEED FOR VACCINATION: ICD-10-CM

## 2020-08-31 DIAGNOSIS — D12.5 ADENOMATOUS POLYP OF SIGMOID COLON: ICD-10-CM

## 2020-08-31 DIAGNOSIS — H02.88A MEIBOMIAN GLAND DYSFUNCTION (MGD), BILATERAL, BOTH UPPER AND LOWER LIDS: ICD-10-CM

## 2020-08-31 DIAGNOSIS — Z00.00 ENCOUNTER FOR ANNUAL HEALTH EXAMINATION: Primary | ICD-10-CM

## 2020-08-31 DIAGNOSIS — H02.88B MEIBOMIAN GLAND DYSFUNCTION (MGD), BILATERAL, BOTH UPPER AND LOWER LIDS: ICD-10-CM

## 2020-08-31 DIAGNOSIS — Z28.21 REFUSED PNEUMOCOCCAL VACCINE: ICD-10-CM

## 2020-08-31 DIAGNOSIS — H25.13 AGE-RELATED NUCLEAR CATARACT OF BOTH EYES: ICD-10-CM

## 2020-08-31 DIAGNOSIS — I83.12 VARICOSE VEINS OF BOTH LOWER EXTREMITIES WITH INFLAMMATION: ICD-10-CM

## 2020-08-31 DIAGNOSIS — R00.1 BRADYCARDIA: ICD-10-CM

## 2020-08-31 DIAGNOSIS — H43.393 FLOATERS, BILATERAL: ICD-10-CM

## 2020-08-31 DIAGNOSIS — E04.1 THYROID NODULE: ICD-10-CM

## 2020-08-31 DIAGNOSIS — Z12.31 VISIT FOR SCREENING MAMMOGRAM: ICD-10-CM

## 2020-08-31 PROCEDURE — 3074F SYST BP LT 130 MM HG: CPT | Performed by: INTERNAL MEDICINE

## 2020-08-31 PROCEDURE — 3008F BODY MASS INDEX DOCD: CPT | Performed by: INTERNAL MEDICINE

## 2020-08-31 PROCEDURE — 96160 PT-FOCUSED HLTH RISK ASSMT: CPT | Performed by: INTERNAL MEDICINE

## 2020-08-31 PROCEDURE — 3078F DIAST BP <80 MM HG: CPT | Performed by: INTERNAL MEDICINE

## 2020-08-31 PROCEDURE — G0439 PPPS, SUBSEQ VISIT: HCPCS | Performed by: INTERNAL MEDICINE

## 2020-08-31 PROCEDURE — 99397 PER PM REEVAL EST PAT 65+ YR: CPT | Performed by: INTERNAL MEDICINE

## 2020-08-31 NOTE — PROGRESS NOTES
HPI:   Shilpa Bedoya is a 69year old female who presents for a Medicare Subsequent Annual Wellness visit (Pt already had Initial Annual Wellness). Patient had returned from [place]. She is staying safe during Covid pandemic by staying home. She does he (Internal Medicine)  Sherry Samayoa MD (Internal Medicine)  Florencia Greene DO (Physical Medicine)    Patient Active Problem List:     Thyroid nodule     Bradycardia     Meibomian gland dysfunction (MGD), bilateral, both upper and lower lids     Age-relate GENERAL: feels well otherwise  SKIN: denies any unusual skin lesions  EYES: denies blurred vision or double vision  HEENT: denies nasal congestion, sinus pain or ST  LUNGS: denies shortness of breath with exertion  CARDIOVASCULAR: denies chest pain on ex normal, no murmur, rub, or gallop   Abdomen:   Soft, non-tender, bowel sounds active all four quadrants,  no masses, no organomegaly   Pelvic: Deferred   Extremities: Extremities normal, atraumatic, no cyanosis or edema  Left shoulder with FROm .  Mild pain (MGD), bilateral, both upper and lower lids  Floaters, bilateral  Ophthalmologist following.     Refused pneumococcal vaccine  Despite knowing benefits        Diet assessment: good     PLAN:  The patient indicates understanding of these issues and agrees to Annually if high risk No results found for: CHLAMYDIA No flowsheet data found.     Screening Mammogram      Mammogram Annually to 75, then as discussed Mammogram due on 11/06/2020 Update Health Maintenance if applicable     Immunizations (Update Immunizatio

## 2020-08-31 NOTE — PATIENT INSTRUCTIONS
Opal Rios's SCREENING SCHEDULE   Tests on this list are recommended by your physician but may not be covered, or covered at this frequency, by your insurer. Please check with your insurance carrier before scheduling to verify coverage.    PREVENTATI often if abnormal Colonoscopy due on 10/20/2020 Update [facility] if applicable    Flex Sigmoidoscopy Screen  Covered every 5 years No results found for this or any previous visit. No flowsheet data found.      Fecal Occult Blood   Covered Annually visit on 04/02/19   • PNEUMOCOCCAL VACC, 13 VALE IM   Orders placed or performed in visit on 07/11/17   • PNEUMOCOCCAL VACC, 13 VALE IM    Please get once after your 65th birthday    Pneumococcal 23 (Pneumovax)  Covered Once after 65 No orders found for this

## 2020-09-01 ENCOUNTER — NURSE ONLY (OUTPATIENT)
Dept: INTERNAL MEDICINE CLINIC | Facility: CLINIC | Age: 70
End: 2020-09-01
Payer: MEDICARE

## 2020-09-01 DIAGNOSIS — M75.02 ADHESIVE CAPSULITIS OF LEFT SHOULDER: ICD-10-CM

## 2020-09-01 DIAGNOSIS — R00.1 BRADYCARDIA: ICD-10-CM

## 2020-09-01 DIAGNOSIS — Z12.11 COLON CANCER SCREENING: Primary | ICD-10-CM

## 2020-09-01 DIAGNOSIS — E04.1 THYROID NODULE: ICD-10-CM

## 2020-09-01 DIAGNOSIS — D12.5 ADENOMATOUS POLYP OF SIGMOID COLON: ICD-10-CM

## 2020-09-01 DIAGNOSIS — H25.13 AGE-RELATED NUCLEAR CATARACT OF BOTH EYES: ICD-10-CM

## 2020-09-01 PROCEDURE — 36415 COLL VENOUS BLD VENIPUNCTURE: CPT | Performed by: INTERNAL MEDICINE

## 2020-09-01 NOTE — PROGRESS NOTES
Pt presented to clinic today for blood draw. Per physician able to draw orders. Orders  documented within chart. Pt tolerated lab draw well.  verified.   Orders drawn include: cbc, lipid, cmp, tsh   Site of draw: rt karthik Cook, JACK

## 2020-09-02 PROBLEM — G47.9 SLEEP DISTURBANCE: Status: RESOLVED | Noted: 2020-03-05 | Resolved: 2020-09-02

## 2020-09-02 PROBLEM — R60.0 LOCALIZED EDEMA: Status: RESOLVED | Noted: 2019-11-01 | Resolved: 2020-09-02

## 2020-09-02 PROBLEM — M75.02 ADHESIVE CAPSULITIS OF LEFT SHOULDER: Status: RESOLVED | Noted: 2020-03-05 | Resolved: 2020-09-02

## 2020-09-02 PROBLEM — R29.3 POOR POSTURE: Status: RESOLVED | Noted: 2020-03-05 | Resolved: 2020-09-02

## 2020-09-02 PROBLEM — M75.102 NONTRAUMATIC TEAR OF LEFT ROTATOR CUFF: Status: RESOLVED | Noted: 2020-03-05 | Resolved: 2020-09-02

## 2020-09-02 PROBLEM — M19.012 ARTHRITIS OF LEFT ACROMIOCLAVICULAR JOINT: Status: RESOLVED | Noted: 2020-07-06 | Resolved: 2020-09-02

## 2020-09-02 PROBLEM — M79.609 PAIN IN LIMB: Status: RESOLVED | Noted: 2019-11-01 | Resolved: 2020-09-02

## 2020-09-02 LAB
ABSOLUTE BASOPHILS: 40 CELLS/UL (ref 0–200)
ABSOLUTE EOSINOPHILS: 210 CELLS/UL (ref 15–500)
ABSOLUTE LYMPHOCYTES: 1725 CELLS/UL (ref 850–3900)
ABSOLUTE MONOCYTES: 430 CELLS/UL (ref 200–950)
ABSOLUTE NEUTROPHILS: 2595 CELLS/UL (ref 1500–7800)
ALBUMIN/GLOBULIN RATIO: 1.8 (CALC) (ref 1–2.5)
ALBUMIN: 4.2 G/DL (ref 3.6–5.1)
ALKALINE PHOSPHATASE: 45 U/L (ref 37–153)
ALT: 12 U/L (ref 6–29)
AST: 14 U/L (ref 10–35)
BASOPHILS: 0.8 %
BILIRUBIN, TOTAL: 0.6 MG/DL (ref 0.2–1.2)
BUN: 15 MG/DL (ref 7–25)
CALCIUM: 9.8 MG/DL (ref 8.6–10.4)
CARBON DIOXIDE: 28 MMOL/L (ref 20–32)
CHLORIDE: 106 MMOL/L (ref 98–110)
CHOL/HDLC RATIO: 2.2 (CALC)
CHOLESTEROL, TOTAL: 200 MG/DL
CREATININE: 0.76 MG/DL (ref 0.6–0.93)
EGFR IF AFRICN AM: 91 ML/MIN/1.73M2
EGFR IF NONAFRICN AM: 79 ML/MIN/1.73M2
EOSINOPHILS: 4.2 %
GLOBULIN: 2.4 G/DL (CALC) (ref 1.9–3.7)
GLUCOSE: 94 MG/DL (ref 65–99)
HDL CHOLESTEROL: 89 MG/DL
HEMATOCRIT: 40.4 % (ref 35–45)
HEMOGLOBIN: 13.3 G/DL (ref 11.7–15.5)
LDL-CHOLESTEROL: 97 MG/DL (CALC)
LYMPHOCYTES: 34.5 %
MCH: 28.4 PG (ref 27–33)
MCHC: 32.9 G/DL (ref 32–36)
MCV: 86.3 FL (ref 80–100)
MONOCYTES: 8.6 %
MPV: 10.1 FL (ref 7.5–12.5)
NEUTROPHILS: 51.9 %
NON-HDL CHOLESTEROL: 111 MG/DL (CALC)
PLATELET COUNT: 264 THOUSAND/UL (ref 140–400)
POTASSIUM: 4.6 MMOL/L (ref 3.5–5.3)
PROTEIN, TOTAL: 6.6 G/DL (ref 6.1–8.1)
RDW: 13.8 % (ref 11–15)
RED BLOOD CELL COUNT: 4.68 MILLION/UL (ref 3.8–5.1)
SODIUM: 141 MMOL/L (ref 135–146)
T4, FREE: 1.1 NG/DL (ref 0.8–1.8)
TRIGLYCERIDES: 58 MG/DL
TSH W/REFLEX TO FT4: 5.15 MIU/L (ref 0.4–4.5)
WHITE BLOOD CELL COUNT: 5 THOUSAND/UL (ref 3.8–10.8)

## 2020-09-07 ENCOUNTER — ORDER TRANSCRIPTION (OUTPATIENT)
Dept: INTERNAL MEDICINE CLINIC | Facility: CLINIC | Age: 70
End: 2020-09-07

## 2020-09-07 DIAGNOSIS — E03.9 ACQUIRED HYPOTHYROIDISM: Primary | ICD-10-CM

## 2020-11-30 ENCOUNTER — HOSPITAL ENCOUNTER (OUTPATIENT)
Dept: MAMMOGRAPHY | Facility: HOSPITAL | Age: 70
Discharge: HOME | End: 2020-11-30
Attending: INTERNAL MEDICINE
Payer: MEDICARE

## 2020-11-30 DIAGNOSIS — Z12.31 VISIT FOR SCREENING MAMMOGRAM: ICD-10-CM

## 2020-11-30 PROCEDURE — 77067 SCR MAMMO BI INCL CAD: CPT | Performed by: INTERNAL MEDICINE

## 2020-11-30 PROCEDURE — 77063 BREAST TOMOSYNTHESIS BI: CPT | Performed by: INTERNAL MEDICINE

## 2021-03-18 ENCOUNTER — TELEPHONE (OUTPATIENT)
Dept: INTERNAL MEDICINE CLINIC | Facility: CLINIC | Age: 71
End: 2021-03-18

## 2021-04-29 ENCOUNTER — OFFICE VISIT (OUTPATIENT)
Dept: INTERNAL MEDICINE CLINIC | Facility: CLINIC | Age: 71
End: 2021-04-29
Payer: MEDICARE

## 2021-04-29 VITALS
WEIGHT: 133 LBS | OXYGEN SATURATION: 99 % | DIASTOLIC BLOOD PRESSURE: 83 MMHG | SYSTOLIC BLOOD PRESSURE: 127 MMHG | HEART RATE: 78 BPM | HEIGHT: 62 IN | BODY MASS INDEX: 24.48 KG/M2

## 2021-04-29 DIAGNOSIS — E03.9 ACQUIRED HYPOTHYROIDISM: ICD-10-CM

## 2021-04-29 DIAGNOSIS — Z12.11 COLON CANCER SCREENING: ICD-10-CM

## 2021-04-29 DIAGNOSIS — I83.12 VARICOSE VEINS OF BOTH LOWER EXTREMITIES WITH INFLAMMATION: ICD-10-CM

## 2021-04-29 DIAGNOSIS — I83.11 VARICOSE VEINS OF BOTH LOWER EXTREMITIES WITH INFLAMMATION: ICD-10-CM

## 2021-04-29 DIAGNOSIS — H25.13 AGE-RELATED NUCLEAR CATARACT OF BOTH EYES: ICD-10-CM

## 2021-04-29 DIAGNOSIS — M67.912 TENDINOPATHY OF ROTATOR CUFF, LEFT: ICD-10-CM

## 2021-04-29 DIAGNOSIS — Z00.00 ENCOUNTER FOR ANNUAL HEALTH EXAMINATION: Primary | ICD-10-CM

## 2021-04-29 DIAGNOSIS — D12.5 ADENOMATOUS POLYP OF SIGMOID COLON: ICD-10-CM

## 2021-04-29 PROCEDURE — G0439 PPPS, SUBSEQ VISIT: HCPCS | Performed by: INTERNAL MEDICINE

## 2021-04-29 PROCEDURE — 3079F DIAST BP 80-89 MM HG: CPT | Performed by: INTERNAL MEDICINE

## 2021-04-29 PROCEDURE — 99397 PER PM REEVAL EST PAT 65+ YR: CPT | Performed by: INTERNAL MEDICINE

## 2021-04-29 PROCEDURE — 3074F SYST BP LT 130 MM HG: CPT | Performed by: INTERNAL MEDICINE

## 2021-04-29 PROCEDURE — 3008F BODY MASS INDEX DOCD: CPT | Performed by: INTERNAL MEDICINE

## 2021-04-29 PROCEDURE — 36415 COLL VENOUS BLD VENIPUNCTURE: CPT | Performed by: INTERNAL MEDICINE

## 2021-04-29 PROCEDURE — 96160 PT-FOCUSED HLTH RISK ASSMT: CPT | Performed by: INTERNAL MEDICINE

## 2021-04-29 NOTE — PROGRESS NOTES
HPI:   Naty Leahy is a 66year old female who presents for a Medicare Subsequent Annual Wellness visit (Pt already had Initial Annual Wellness).     Patient Active Problem List:     Thyroid nodule     Bradycardia     Meibomian gland dysfunction (MGD) standard forms performed Face to Face with patient and Family/surrogate (if present), and forms available to patient in AVS       She has never smoked tobacco.    CAGE screening score of 0 on 4/29/2021, showing low risk of alcohol abuse.         Patient Car blurred vision or double vision  HEENT: denies nasal congestion, sinus pain   LUNGS: denies shortness of breath with exertion  CARDIOVASCULAR: denies chest pain on exertion  GI: denies abdominal pain, denies heartburn  : denies dysuria, vaginal discharge respirations unlabored   Heart:  Regular rate and rhythm, S1 and S2 normal, no murmur, rub, or gallop   Abdomen:   Soft, non-tender, bowel sounds active all four quadrants,  no masses, no organomegaly   Pelvic: Deferred   Extremities: Left shoulder pain wi CLN    Tendinopathy of rotator cuff, left  Continue home exercise     Encourage to get Covid vaccine. Diet assessment: good     PLAN:  The patient indicates understanding of these issues and agrees to the plan. Continue with current treatment plan.   Ro Miller Every two years Last Dexa Scan:    XR DEXA BONE DENSITOMETRY (CPT=77080) 11/18/2019   No flowsheet data found.     Pap and Pelvic      Pap: Every 3 yrs age 21-65 or Pap+HPV every 5 yrs age 33-67, age 72 and older at high risk There are no preventive care re

## 2021-04-29 NOTE — PATIENT INSTRUCTIONS
Yelena Rios's SCREENING SCHEDULE   Tests on this list are recommended by your physician but may not be covered, or covered at this frequency, by your insurer. Please check with your insurance carrier before scheduling to verify coverage.    PREVENTATI if abnormal Colonoscopy due on 10/20/2020 Update [facility] if applicable    Flex Sigmoidoscopy Screen  Covered every 5 years No results found for this or any previous visit. No flowsheet data found.      Fecal Occult Blood   Covered Annually Occult on 04/02/19   • PNEUMOCOCCAL VACC, 13 VALE IM   Orders placed or performed in visit on 07/11/17   • PNEUMOCOCCAL VACC, 13 VALE IM    Please get once after your 65th birthday    Pneumococcal 23 (Pneumovax)  Covered Once after 65 No orders found for this or an

## 2021-04-30 ENCOUNTER — TELEPHONE (OUTPATIENT)
Dept: GASTROENTEROLOGY | Facility: CLINIC | Age: 71
End: 2021-04-30

## 2021-04-30 DIAGNOSIS — Z86.010 PERSONAL HISTORY OF COLONIC POLYPS: Primary | ICD-10-CM

## 2021-04-30 NOTE — TELEPHONE ENCOUNTER
[facility] HOSP - [facility] Endoscopy Report        Preoperative Diagnosis:  - family history of colon cancer        Postoperative Diagnosis:  - colon polyps x 3  - internal hemorrhoids        Procedure:    Colonoscopy         Surgeon:  Germania Bynum M.D. TISSUE: [id number]  Order: [id number]  Collected:  10/20/2017 12:33 PM   Status:  Final result   Visible to patient:  No (not released)   Dx:  Family history of colon cancer   2 Result Notes     2 Follow-up Encounters  Component   Ref Range & Units    Case

## 2021-05-03 RX ORDER — SODIUM, POTASSIUM,MAG SULFATES 17.5-3.13G
SOLUTION, RECONSTITUTED, ORAL ORAL
Qty: 1 BOTTLE | Refills: 0 | Status: SHIPPED | OUTPATIENT
Start: 2021-05-03 | End: 2021-12-04 | Stop reason: ALTCHOICE

## 2021-05-03 RX ORDER — RIBOFLAVIN (VITAMIN B2) 100 MG
100 TABLET ORAL DAILY
COMMUNITY

## 2021-05-03 NOTE — TELEPHONE ENCOUNTER
The patient's chart has been reviewed. Okay to schedule pt for 3 year CLN recall r/t hx colon polyps with Dr. Farzaneh Townsend.      Advise IV Twilight or MAC sedation with split dose Suprep or Colyte/TriLyte or equivalent(eRx) preparation.   -Eligible for NE: Yes  -D

## 2021-05-03 NOTE — TELEPHONE ENCOUNTER
Last Procedure, Date, MD:  Dr. Mendoza Alu colonoscopy 10/20/2017  Last Diagnosis:  Colon polyps x3, internal hemorrhoids  Recalled for (mth/yrs): 3 years  Sedation used previously:  IV  Last Prep Used (if known):  suprep  Quality of prep (if known): good  Antic

## 2021-05-14 ENCOUNTER — TELEPHONE (OUTPATIENT)
Dept: INTERNAL MEDICINE CLINIC | Facility: CLINIC | Age: 71
End: 2021-05-14

## 2021-05-14 DIAGNOSIS — H53.8 BLURRED VISION: ICD-10-CM

## 2021-05-14 DIAGNOSIS — H43.393 FLOATERS, BILATERAL: ICD-10-CM

## 2021-05-14 DIAGNOSIS — H25.13 AGE-RELATED NUCLEAR CATARACT OF BOTH EYES: Primary | ICD-10-CM

## 2021-05-14 NOTE — TELEPHONE ENCOUNTER
Holly Mckenzie from Dr. Marek Reeves office called and left a voicemail on nurse referral line stating that patient has an appt on Tues 5/18.  And when she talked to pt, pt states that she talked to someone in PCP office and was told there was a referral on file alread

## 2021-05-16 NOTE — TELEPHONE ENCOUNTER
Scheduled for:  Colonoscopy 11434  Provider Name:  Dr. Sony Major  Date:  6/1/21  Location:    [address] [place] [address]  Sedation:  MAC  Time:  0930 (pt is aware to arrive at 0830)   Prep:  Suprep, mailed on 5/17/21  Meds/Allergies Reconciled?:  Physician reviewed   Diagnosis with

## 2021-05-17 NOTE — TELEPHONE ENCOUNTER
[employer] system down on 5/14. Referral Notes  Number of Notes: 1  .   Type Date User Summary Attachment    05/14/2021  5:01 PM Chalice Old A - -   Note    [employer] DOWN 5-                    Open/pending  5/17 9:34

## 2021-05-18 NOTE — TELEPHONE ENCOUNTER
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 ST resumed with video visits on 4/30/20. PAIN  [x]No     []Yes      Location:  N/A  Pain Rating (0-10 pain scale): 0/10  Pain Description:  NA    SUBJECTIVE  Patient presents to video visit with caregiver. 12/23/20: pt continues to demonstrate dysfluencies in his speech and much more severe recently. This started about 2 months ago and was less severe. Educated pt's mom that we will continue to monitor dysfluencies and if it does not improve in next 2 months, we will evaluate for diagnosis of Dysfluent speech. GOALS/ TREATMENT SESSION:    8. Spontaneously use \"verb+ing\" 10 times per session for 3/4 sessions.:     13. Imitate 5 word utterances 10 times per session for 3/4 sessions. :  2/2 (plus 11 spontaneously). Goal met. 14. Spontaneously use 5 word utterances 10 times per session for 3/4 sessions.:  Goal met.     16. Verbally state the function of 5 objects with 80% accuracy for 3/4 sessions.: (spoon, remote, soap, bed, toothbrush): + + + ++ =100%  1st time pt at 80%     Provide answer given picture of answer as a cue:   Imitate with picture cues for answers; 17. Label 4 categories (food, animals, clothes, toys) given 2-3 items belonging to the group with 80% accuracy for each category for 3/4 sessions.: + + + - (missed food)      18. Use possessive \"'s\" with 805 accuracy. 19. Use plural  \" -s\" with 80% accuracy. 20. Produce /s / in all positions of words and in phrases after 1 model with 80% acc. :    Beginning/words spontaneously:   After 1 model: - - -  imitate word in 2 parts: 1/4 1/3 1/5      21. Produce / k / in all positions of words and in phrases after 1 model with 80% acc. 22. Produce / g / in all positions of words and in phrases after 1 model with 80% acc. 23. Produce /f/ in all positions of words and in phrases after 1 model with 80% acc. Imitate/ f /in isolation after 1 model: 5/7    24. Produce /p/ at ends of words and in phrases after 1 model with 80% acc.: 3/3 + + + 2/2    EDUCATION:  Education provided to patient/family/caregiver:      []Yes/New education    [x]Yes/Continued Review of prior education   __No  If yes Education Provided: Activities/home work for goals :  Review=13,14,16.  New=17, S, F, P  Method of Education:     [x]Discussion     [x]Demonstration    [x] Written      []Other  Evaluation of Patients Response to Education:         [x]Patient and or caregiver verbalized understanding  []Patient and or Caregiver Demonstrated without assistance   []Patient and or Caregiver Demonstrated with assistance  []Needs additional instruction to demonstrate understanding of education  ASSESSMENT  Patient tolerated todays treatment session:    [x] Good   []  Fair   []  Poor  Limitations/difficulties with treatment session due to:   []Pain     []Fatigue     []Other medical complications     []Other:   Goal Assessment: [x] No Change    []Improved  Comments:  PLAN  [x]Continue with current plan of care  [][facility]  []IHold per patient request  [] Change Treatment plan:  [] Insurance hold  __ Other          TIME

## 2021-05-20 ENCOUNTER — TELEPHONE (OUTPATIENT)
Dept: INTERNAL MEDICINE CLINIC | Facility: CLINIC | Age: 71
End: 2021-05-20

## 2021-05-20 NOTE — TELEPHONE ENCOUNTER
----- Message from Ivana Alarcon MD sent at 5/3/2021 12:47 AM CDT -----  Normal thyroid function. Please call.

## 2021-05-22 ENCOUNTER — TELEPHONE (OUTPATIENT)
Dept: GASTROENTEROLOGY | Facility: CLINIC | Age: 71
End: 2021-05-22

## 2021-05-22 NOTE — TELEPHONE ENCOUNTER
Answering service call  Message received that patient wanted to know if safe to get covid vaccine today with endoscopy procedure scheduled 6/1    Called back, no answer, left message with response that it is ok to proceed with vaccination today and procedu

## 2021-05-24 ENCOUNTER — TELEPHONE (OUTPATIENT)
Dept: GASTROENTEROLOGY | Facility: CLINIC | Age: 71
End: 2021-05-24

## 2021-05-24 DIAGNOSIS — Z86.010 PERSONAL HISTORY OF COLONIC POLYPS: Primary | ICD-10-CM

## 2021-05-24 NOTE — TELEPHONE ENCOUNTER
Patient calling to reschedule colonoscopy scheduled 6/1/2021 due to no transportation. Please call at [phone], informed of the 72 hour call back, thanks.

## 2021-05-24 NOTE — TELEPHONE ENCOUNTER
Rescheduled for:  Colonoscopy [address]76  Provider Name:  Dr. Sebastien Lopez  Date:    From-6/1/21 To-8/2/21  Location:    [facility]  Sedation:  MAC  Time:    From-0930  To-0900 (pt is aware to arrive at 0800)   Prep:  Suprep, mailed new instructions on 5/25/21  Meds/Allergi

## 2021-06-18 ENCOUNTER — TELEPHONE (OUTPATIENT)
Dept: INTERNAL MEDICINE CLINIC | Facility: CLINIC | Age: 71
End: 2021-06-18

## 2021-06-18 DIAGNOSIS — Z01.818 PREOP EXAMINATION: Primary | ICD-10-CM

## 2021-06-18 NOTE — TELEPHONE ENCOUNTER
Patient states she's having eye surgery in July 1st. Patient spoke to [address] [place] [address], and  recommend it for her to have blood work before surgery. Please call patient back for more information.

## 2021-06-21 ENCOUNTER — NURSE ONLY (OUTPATIENT)
Dept: INTERNAL MEDICINE CLINIC | Facility: CLINIC | Age: 71
End: 2021-06-21
Payer: MEDICARE

## 2021-06-21 DIAGNOSIS — Z01.818 PRE-OP TESTING: ICD-10-CM

## 2021-06-21 PROCEDURE — 36415 COLL VENOUS BLD VENIPUNCTURE: CPT | Performed by: INTERNAL MEDICINE

## 2021-06-23 ENCOUNTER — TELEPHONE (OUTPATIENT)
Dept: INTERNAL MEDICINE CLINIC | Facility: CLINIC | Age: 71
End: 2021-06-23

## 2021-07-09 ENCOUNTER — TELEPHONE (OUTPATIENT)
Dept: INTERNAL MEDICINE CLINIC | Facility: CLINIC | Age: 71
End: 2021-07-09

## 2021-07-09 NOTE — TELEPHONE ENCOUNTER
----- Message from Angle Gonzalez MD sent at 7/6/2021  1:07 AM CDT -----  Normal, please call patient.

## 2021-07-20 ENCOUNTER — HOSPITAL ENCOUNTER (OUTPATIENT)
Age: 71
Discharge: HOME | End: 2021-07-20
Payer: MEDICARE

## 2021-07-20 VITALS
SYSTOLIC BLOOD PRESSURE: 135 MMHG | OXYGEN SATURATION: 99 % | RESPIRATION RATE: 18 BRPM | TEMPERATURE: 99 F | DIASTOLIC BLOOD PRESSURE: 76 MMHG | HEART RATE: 74 BPM

## 2021-07-20 DIAGNOSIS — N39.0 URINARY TRACT INFECTION WITHOUT HEMATURIA, SITE UNSPECIFIED: Primary | ICD-10-CM

## 2021-07-20 LAB
BILIRUB UR QL STRIP: NEGATIVE
CLARITY UR: CLEAR
COLOR UR: YELLOW
GLUCOSE UR STRIP-MCNC: NEGATIVE MG/DL
HGB UR QL STRIP: NEGATIVE
KETONES UR STRIP-MCNC: NEGATIVE MG/DL
NITRITE UR QL STRIP: NEGATIVE
PH UR STRIP: 7.5 [PH]
PROT UR STRIP-MCNC: NEGATIVE MG/DL
SP GR UR STRIP: 1.01
UROBILINOGEN UR STRIP-ACNC: <2 MG/DL

## 2021-07-20 PROCEDURE — 99203 OFFICE O/P NEW LOW 30 MIN: CPT | Performed by: NURSE PRACTITIONER

## 2021-07-20 PROCEDURE — 81002 URINALYSIS NONAUTO W/O SCOPE: CPT | Performed by: NURSE PRACTITIONER

## 2021-07-20 RX ORDER — CEPHALEXIN 500 MG/1
500 CAPSULE ORAL 2 TIMES DAILY
Qty: 14 CAPSULE | Refills: 0 | Status: SHIPPED | OUTPATIENT
Start: 2021-07-20 | End: 2021-07-27

## 2021-07-21 NOTE — ED PROVIDER NOTES
Patient Seen in: [facility]      History   Patient presents with:  Urinary Symptoms    Stated Complaint: Urinary Symptoms    HPI/Subjective:   HPI    This is a 25-year-old female presenting with urinary symptoms.   Patient states, she has bee Cardiovascular:      Rate and Rhythm: Normal rate. Heart sounds: Normal heart sounds. Pulmonary:      Effort: Pulmonary effort is normal.      Breath sounds: Normal breath sounds. Abdominal:      Palpations: Abdomen is soft. Tenderness:  The appointment as soon as possible for a visit in 3 days            Medications Prescribed:  Discharge Medication List as of 7/20/2021  7:55 PM    START taking these medications    cephALEXin 500 MG Oral Cap  Take 1 capsule (500 mg total) by mouth 2 (two) socorro

## 2021-07-21 NOTE — ED INITIAL ASSESSMENT (HPI)
Pt here with complaints of a possible UTI, pt began having pain with urination today, pt wants to be checked for a UTI because she is going out of town, pt denies any ab pain or fevers

## 2021-07-27 ENCOUNTER — TELEPHONE (OUTPATIENT)
Dept: GASTROENTEROLOGY | Facility: CLINIC | Age: 71
End: 2021-07-27

## 2021-07-27 DIAGNOSIS — Z86.010 HISTORY OF COLONIC POLYPS: Primary | ICD-10-CM

## 2021-07-27 NOTE — TELEPHONE ENCOUNTER
Dr. Vicenta Rivera--    I received a call from Miguel Mello/RN stating that this patient had to be rescheduled from [facility] to [address] [place] [address] since she has ESBL and may need bacterial isolation. Miguel gave me the okay to reschedule her on 9/10/21 at 1400 this will be past your block time. Please advise if your okay with this.  Thank you

## 2021-08-07 ENCOUNTER — MED REC SCAN ONLY (OUTPATIENT)
Dept: ORTHOPEDICS CLINIC | Facility: CLINIC | Age: 71
End: 2021-08-07

## 2021-09-03 NOTE — PAT NURSING NOTE
Per IC, if patient completed medication regimen for ESBL infection:    -Isolate if patient is having any S/S  -Do not isolate if pt does not have any S/S    I spoke with Pt and she stated she has no S/S and has completed her medication regimen for the UTI

## 2021-09-07 ENCOUNTER — TELEPHONE (OUTPATIENT)
Dept: GASTROENTEROLOGY | Facility: CLINIC | Age: 71
End: 2021-09-07

## 2021-09-07 ENCOUNTER — LAB ENCOUNTER (OUTPATIENT)
Dept: LAB | Facility: HOSPITAL | Age: 71
End: 2021-09-07
Attending: INTERNAL MEDICINE
Payer: MEDICARE

## 2021-09-07 DIAGNOSIS — Z01.818 PRE-OP TESTING: ICD-10-CM

## 2021-09-07 NOTE — TELEPHONE ENCOUNTER
Erik Delatorre for insurance verification states pt needs a PA from her insurance before procedure on 9-10.  Please advise

## 2021-09-08 ENCOUNTER — TELEPHONE (OUTPATIENT)
Dept: GASTROENTEROLOGY | Facility: CLINIC | Age: 71
End: 2021-09-08

## 2021-09-08 LAB — SARS-COV-2 RNA RESP QL NAA+PROBE: NOT DETECTED

## 2021-09-08 NOTE — TELEPHONE ENCOUNTER
I spoke to Magalys Amse with [employer] since status marked as closed. She told me that she will work on this one today.

## 2021-09-08 NOTE — TELEPHONE ENCOUNTER
Authorized per referral # [id number] [facility] extended authorization date from 9/8/2021-10/8/2021.

## 2021-09-08 NOTE — TELEPHONE ENCOUNTER
Patient wanted to know if she could have lunch tomorrow. I let her know that she can have a light breakfast then has to switch to clear liquid diet until up to 3 hours prior to procedure. Patient voiced understanding.

## 2021-09-10 ENCOUNTER — ANESTHESIA (OUTPATIENT)
Dept: ENDOSCOPY | Facility: HOSPITAL | Age: 71
End: 2021-09-10
Payer: MEDICARE

## 2021-09-10 ENCOUNTER — HOSPITAL ENCOUNTER (OUTPATIENT)
Facility: HOSPITAL | Age: 71
Setting detail: HOSPITAL OUTPATIENT SURGERY
Discharge: HOME | End: 2021-09-10
Attending: INTERNAL MEDICINE | Admitting: INTERNAL MEDICINE
Payer: MEDICARE

## 2021-09-10 ENCOUNTER — ANESTHESIA EVENT (OUTPATIENT)
Dept: ENDOSCOPY | Facility: HOSPITAL | Age: 71
End: 2021-09-10
Payer: MEDICARE

## 2021-09-10 VITALS
BODY MASS INDEX: 23.92 KG/M2 | DIASTOLIC BLOOD PRESSURE: 70 MMHG | HEART RATE: 58 BPM | RESPIRATION RATE: 20 BRPM | TEMPERATURE: 98 F | OXYGEN SATURATION: 100 % | WEIGHT: 130 LBS | SYSTOLIC BLOOD PRESSURE: 111 MMHG | HEIGHT: 62 IN

## 2021-09-10 DIAGNOSIS — Z86.010 HISTORY OF COLONIC POLYPS: ICD-10-CM

## 2021-09-10 DIAGNOSIS — Z01.818 PRE-OP TESTING: Primary | ICD-10-CM

## 2021-09-10 PROCEDURE — 45380 COLONOSCOPY AND BIOPSY: CPT | Performed by: INTERNAL MEDICINE

## 2021-09-10 PROCEDURE — 45385 COLONOSCOPY W/LESION REMOVAL: CPT | Performed by: INTERNAL MEDICINE

## 2021-09-10 PROCEDURE — 0DBP8ZX EXCISION OF RECTUM, VIA NATURAL OR ARTIFICIAL OPENING ENDOSCOPIC, DIAGNOSTIC: ICD-10-PCS | Performed by: INTERNAL MEDICINE

## 2021-09-10 PROCEDURE — 0DBH8ZX EXCISION OF CECUM, VIA NATURAL OR ARTIFICIAL OPENING ENDOSCOPIC, DIAGNOSTIC: ICD-10-PCS | Performed by: INTERNAL MEDICINE

## 2021-09-10 RX ORDER — SODIUM CHLORIDE, SODIUM LACTATE, POTASSIUM CHLORIDE, CALCIUM CHLORIDE 600; 310; 30; 20 MG/100ML; MG/100ML; MG/100ML; MG/100ML
INJECTION, SOLUTION INTRAVENOUS CONTINUOUS
Status: DISCONTINUED | OUTPATIENT
Start: 2021-09-10 | End: 2021-09-10

## 2021-09-10 RX ADMIN — SODIUM CHLORIDE, SODIUM LACTATE, POTASSIUM CHLORIDE, CALCIUM CHLORIDE: 600; 310; 30; 20 INJECTION, SOLUTION INTRAVENOUS at 13:43:00

## 2021-09-10 NOTE — H&P
History & Physical Examination    Patient Name: Janice Shearer  MRN: [id number]  CSN: [id number]  YOB: 1950    Diagnosis:     History of colon polyps       Ascorbic Acid (VITAMIN C) 100 MG Oral Tab, Take 100 mg by mouth daily. , Disp: , Rfl: If not normal, please explain:   HEENT [x ] [ x]    NECK & BACK [x ] [x ]    HEART [x ] [ x]    LUNGS [x ] [ x]    ABDOMEN [x ] [x ]    UROGENITAL [ ] [ ]    EXTREMITIES [x ] [x ]    OTHER        [ x ] I have discussed the risks and benefits and alternativ

## 2021-09-10 NOTE — OPERATIVE REPORT
[facility] HOSP - [facility] Endoscopy Report      Preoperative Diagnosis:  - history of colon polyps       Postoperative Diagnosis:  - colon polyps x 2  - internal hemorrhoids      Procedure:    Colonoscopy       Surgeon:  Enma Ko M.D.     Anesthesia:

## 2021-09-10 NOTE — ANESTHESIA PREPROCEDURE EVALUATION
Anesthesia PreOp Note    HPI:     Enrique Magallon is a 66year old female who presents for preoperative consultation requested by: Marissa Spicer MD    Date of Surgery: 9/10/2021    Procedure(s):  COLONOSCOPY  Indication: History of colonic polyps    Re needed. , Disp: , Rfl:   Na Sulfate-K Sulfate-Mg Sulf (SUPREP BOWEL PREP KIT) 17.5-3.13-1.6 GM/177ML Oral Solution, Take prep as directed by gastro office.  May substitute with Trilyte/generic equivalent if needed, Disp: 1 Bottle, Rfl: 0      lactated ring Social History Narrative      ** Merged History Encounter **             Live with spouse, feels safe in situation. The patient does not use an assistive device. .        The patient does live in a home with stairs.     Social Determinants of Heal TempSrc: Oral   SpO2: 98%   Weight: 59 kg (130 lb)   Height: 1.575 m (5' 2\")        Anesthesia Evaluation     Patient summary reviewed and Nursing notes reviewed    Airway   Mallampati: II  TM distance: >3 FB  Neck ROM: full  Dental      Pulmonary - neg

## 2021-09-10 NOTE — ANESTHESIA POSTPROCEDURE EVALUATION
Patient: Wagner Diaz    Procedure Summary     Date: 09/10/21 Room / Location: [address] [place] [address] ENDOSCOPY 01 / [address] [place] [address] ENDOSCOPY    Anesthesia Start: 7715 Anesthesia Stop: 7074    Procedure: COLONOSCOPY (N/A ) Diagnosis:       History of colonic polyps      (Polyps, hem

## 2021-09-14 ENCOUNTER — TELEPHONE (OUTPATIENT)
Dept: GASTROENTEROLOGY | Facility: CLINIC | Age: 71
End: 2021-09-14

## 2021-09-14 NOTE — TELEPHONE ENCOUNTER
Rings and then goes busy. Please follow up. Health maintenance updated. 7 year colonoscopy recall placed in patient outreach. Next due on 09/10/2028 per Dr. Samantha Shepard.

## 2021-09-14 NOTE — TELEPHONE ENCOUNTER
Jay Wells MD  [facility]  I wanted to get back to you with your colonoscopy results.  You had 2 colon polyps removed which were benign.  I would advise a repeat colonoscopy in 7 years to make sure no new polyps are forming.       You al

## 2021-12-03 ENCOUNTER — OFFICE VISIT (OUTPATIENT)
Dept: INTERNAL MEDICINE CLINIC | Facility: CLINIC | Age: 71
End: 2021-12-03
Payer: MEDICARE

## 2021-12-03 VITALS
BODY MASS INDEX: 24.07 KG/M2 | SYSTOLIC BLOOD PRESSURE: 120 MMHG | HEIGHT: 62 IN | HEART RATE: 82 BPM | DIASTOLIC BLOOD PRESSURE: 72 MMHG | WEIGHT: 130.81 LBS | OXYGEN SATURATION: 99 %

## 2021-12-03 DIAGNOSIS — G89.29 CHRONIC LEFT SHOULDER PAIN: Primary | ICD-10-CM

## 2021-12-03 DIAGNOSIS — Z12.31 VISIT FOR SCREENING MAMMOGRAM: ICD-10-CM

## 2021-12-03 DIAGNOSIS — M25.512 CHRONIC LEFT SHOULDER PAIN: Primary | ICD-10-CM

## 2021-12-03 DIAGNOSIS — Z28.20 VACCINE REFUSED BY PATIENT: ICD-10-CM

## 2021-12-03 PROBLEM — I83.819 PAIN DUE TO VARICOSE VEINS OF LOWER EXTREMITY: Status: ACTIVE | Noted: 2021-12-03

## 2021-12-03 PROCEDURE — 99213 OFFICE O/P EST LOW 20 MIN: CPT | Performed by: INTERNAL MEDICINE

## 2021-12-03 PROCEDURE — 3008F BODY MASS INDEX DOCD: CPT | Performed by: INTERNAL MEDICINE

## 2021-12-03 PROCEDURE — 3074F SYST BP LT 130 MM HG: CPT | Performed by: INTERNAL MEDICINE

## 2021-12-03 PROCEDURE — 3078F DIAST BP <80 MM HG: CPT | Performed by: INTERNAL MEDICINE

## 2021-12-03 RX ORDER — PREDNISOLONE ACETATE 10 MG/ML
SUSPENSION/ DROPS OPHTHALMIC
COMMUNITY
Start: 2021-07-08

## 2021-12-03 RX ORDER — OFLOXACIN 3 MG/ML
SOLUTION/ DROPS OPHTHALMIC
COMMUNITY
Start: 2021-07-07

## 2021-12-03 RX ORDER — SULFAMETHOXAZOLE AND TRIMETHOPRIM 800; 160 MG/1; MG/1
1 TABLET ORAL 2 TIMES DAILY
COMMUNITY
Start: 2021-07-24 | End: 2021-12-04 | Stop reason: ALTCHOICE

## 2021-12-03 NOTE — PROGRESS NOTES
HPI:    Patient ID: Bernadette Apodaca is a 66year old female. HPI    Patient is  here for follow-up. She has chronic left shoulder pain for > 1 year. She had physical therapy and cortisone injection with temporary relief. Endorses active lifestyle.   Ivana Reyes 20 - 32 mmol/L 29    CALCIUM      8.6 - 10.4 mg/dL 9.5    PROTEIN, TOTAL      6.1 - 8.1 g/dL 6.6    Albumin      3.6 - 5.1 g/dL 4.1    Globulin      1.9 - 3.7 g/dL (calc) 2.5    A/G Ratio      1.0 - 2.5 (calc) 1.6    Total Bilirubin      0.2 - 1.2 mg/dL 0. colon CA,  age 58   • Other (Other) Father    • No Known Problems Mother    • Diabetes Neg    • Glaucoma Neg    • Macular degeneration Neg    • Breast Cancer Neg    • Ovarian Cancer Neg       Social History: Social History    Tobacco Use      Smoking s Appearance: Normal appearance. HENT:      Head: Normocephalic. Nose: Nose normal.      Mouth/Throat:      Mouth: Mucous membranes are moist.   Eyes:      General: No scleral icterus. Extraocular Movements: Extraocular movements intact. INTERNAL  GIULIA SCREENING BILAT (OPC=77164)            Patient affirmed understanding of plan and all questions were answered

## 2021-12-10 ENCOUNTER — TELEPHONE (OUTPATIENT)
Dept: FAMILY MEDICINE CLINIC | Facility: CLINIC | Age: 71
End: 2021-12-10

## 2021-12-10 DIAGNOSIS — M25.512 CHRONIC LEFT SHOULDER PAIN: Primary | ICD-10-CM

## 2021-12-10 DIAGNOSIS — G89.29 CHRONIC LEFT SHOULDER PAIN: Primary | ICD-10-CM

## 2021-12-10 NOTE — TELEPHONE ENCOUNTER
Pt called requesting another referral for chiropractor  For 10 more visits    Pt wants to be informed when this has been  authorized

## 2021-12-27 NOTE — TELEPHONE ENCOUNTER
Pt called back stating that her 10 visits have been used   She goes to chiropractor 3 x a week and needs 10 more visits  To please call her back when this has been approved

## 2022-01-21 ENCOUNTER — HOSPITAL ENCOUNTER (OUTPATIENT)
Dept: MAMMOGRAPHY | Facility: HOSPITAL | Age: 72
Discharge: HOME | End: 2022-01-21
Attending: INTERNAL MEDICINE
Payer: MEDICARE

## 2022-01-21 DIAGNOSIS — Z12.31 VISIT FOR SCREENING MAMMOGRAM: ICD-10-CM

## 2022-01-21 PROCEDURE — 77063 BREAST TOMOSYNTHESIS BI: CPT | Performed by: INTERNAL MEDICINE

## 2022-01-21 PROCEDURE — 77067 SCR MAMMO BI INCL CAD: CPT | Performed by: INTERNAL MEDICINE

## 2022-01-27 ENCOUNTER — TELEPHONE (OUTPATIENT)
Dept: INTERNAL MEDICINE CLINIC | Facility: CLINIC | Age: 72
End: 2022-01-27

## 2022-01-27 NOTE — TELEPHONE ENCOUNTER
----- Message from Meagan Alonzo MD sent at 1/24/2022  9:02 AM CST -----  Please call.   Benign mammogram. Continue routine screening and self breast exam.

## 2022-01-27 NOTE — TELEPHONE ENCOUNTER
Patient called back.  was verified and results were given. Patient didn't have any further questions.

## 2022-02-18 ENCOUNTER — TELEPHONE (OUTPATIENT)
Dept: INTERNAL MEDICINE CLINIC | Facility: CLINIC | Age: 72
End: 2022-02-18

## 2022-02-18 NOTE — TELEPHONE ENCOUNTER
Pt is calling requesting referral for more visits with chiropractor. Pt states that she is doing better but feels that she does need more visits.        Please call and advise

## 2022-04-25 ENCOUNTER — HOSPITAL ENCOUNTER (OUTPATIENT)
Age: 72
Discharge: HOME | End: 2022-04-25
Payer: MEDICARE

## 2022-04-25 VITALS
SYSTOLIC BLOOD PRESSURE: 133 MMHG | RESPIRATION RATE: 18 BRPM | DIASTOLIC BLOOD PRESSURE: 65 MMHG | OXYGEN SATURATION: 100 % | TEMPERATURE: 98 F | HEART RATE: 76 BPM

## 2022-04-25 DIAGNOSIS — N30.01 ACUTE CYSTITIS WITH HEMATURIA: Primary | ICD-10-CM

## 2022-04-25 LAB
BILIRUB UR QL STRIP: NEGATIVE
COLOR UR: YELLOW
GLUCOSE UR STRIP-MCNC: NEGATIVE MG/DL
KETONES UR STRIP-MCNC: 15 MG/DL
NITRITE UR QL STRIP: NEGATIVE
PH UR STRIP: 5 [PH]
PROT UR STRIP-MCNC: 30 MG/DL
SP GR UR STRIP: 1.02
UROBILINOGEN UR STRIP-ACNC: <2 MG/DL

## 2022-04-25 PROCEDURE — 99213 OFFICE O/P EST LOW 20 MIN: CPT | Performed by: NURSE PRACTITIONER

## 2022-04-25 PROCEDURE — 81002 URINALYSIS NONAUTO W/O SCOPE: CPT | Performed by: NURSE PRACTITIONER

## 2022-04-25 RX ORDER — SULFAMETHOXAZOLE AND TRIMETHOPRIM 800; 160 MG/1; MG/1
1 TABLET ORAL 2 TIMES DAILY
Qty: 14 TABLET | Refills: 0 | Status: SHIPPED | OUTPATIENT
Start: 2022-04-25 | End: 2022-05-02

## 2022-04-25 NOTE — ED INITIAL ASSESSMENT (HPI)
Pt presents with urinary frequency, painful urination and low \"pelvic/vaginal sensation\". No back pain, no blood in urine, no vaginal discharge.

## 2022-05-16 ENCOUNTER — OFFICE VISIT (OUTPATIENT)
Dept: INTERNAL MEDICINE CLINIC | Facility: CLINIC | Age: 72
End: 2022-05-16
Payer: MEDICARE

## 2022-05-16 VITALS
WEIGHT: 132.88 LBS | DIASTOLIC BLOOD PRESSURE: 68 MMHG | HEIGHT: 62 IN | BODY MASS INDEX: 24.45 KG/M2 | SYSTOLIC BLOOD PRESSURE: 124 MMHG | HEART RATE: 71 BPM

## 2022-05-16 DIAGNOSIS — E04.1 THYROID NODULE: ICD-10-CM

## 2022-05-16 DIAGNOSIS — H25.13 AGE-RELATED NUCLEAR CATARACT OF BOTH EYES: ICD-10-CM

## 2022-05-16 DIAGNOSIS — Z76.89 ENCOUNTER TO ESTABLISH CARE: Primary | ICD-10-CM

## 2022-05-16 NOTE — PATIENT INSTRUCTIONS
Please schedule appointments with Dr. Edward Hurd and with Endocrinology. Please schedule a Medicare physical with me soon.

## 2022-06-17 ENCOUNTER — OFFICE VISIT (OUTPATIENT)
Dept: INTERNAL MEDICINE CLINIC | Facility: CLINIC | Age: 72
End: 2022-06-17
Payer: MEDICARE

## 2022-06-17 VITALS
WEIGHT: 134.38 LBS | HEART RATE: 60 BPM | BODY MASS INDEX: 24.73 KG/M2 | HEIGHT: 62 IN | DIASTOLIC BLOOD PRESSURE: 76 MMHG | SYSTOLIC BLOOD PRESSURE: 123 MMHG

## 2022-06-17 DIAGNOSIS — Z00.00 ENCOUNTER FOR ANNUAL HEALTH EXAMINATION: ICD-10-CM

## 2022-06-17 DIAGNOSIS — Z00.00 ANNUAL PHYSICAL EXAM: Primary | ICD-10-CM

## 2022-06-17 DIAGNOSIS — Z86.010 HX OF ADENOMATOUS COLONIC POLYPS: ICD-10-CM

## 2022-06-17 DIAGNOSIS — M85.80 OSTEOPENIA, UNSPECIFIED LOCATION: ICD-10-CM

## 2022-06-17 DIAGNOSIS — I70.0 AORTIC ATHEROSCLEROSIS (HCC): ICD-10-CM

## 2022-06-17 PROCEDURE — 3074F SYST BP LT 130 MM HG: CPT | Performed by: INTERNAL MEDICINE

## 2022-06-17 PROCEDURE — 96160 PT-FOCUSED HLTH RISK ASSMT: CPT | Performed by: INTERNAL MEDICINE

## 2022-06-17 PROCEDURE — 1126F AMNT PAIN NOTED NONE PRSNT: CPT | Performed by: INTERNAL MEDICINE

## 2022-06-17 PROCEDURE — 3078F DIAST BP <80 MM HG: CPT | Performed by: INTERNAL MEDICINE

## 2022-06-17 PROCEDURE — 3008F BODY MASS INDEX DOCD: CPT | Performed by: INTERNAL MEDICINE

## 2022-06-24 ENCOUNTER — LAB ENCOUNTER (OUTPATIENT)
Dept: LAB | Facility: HOSPITAL | Age: 72
End: 2022-06-24
Attending: INTERNAL MEDICINE
Payer: MEDICARE

## 2022-06-24 DIAGNOSIS — Z00.00 ANNUAL PHYSICAL EXAM: ICD-10-CM

## 2022-06-24 LAB
ALBUMIN SERPL-MCNC: 3.7 G/DL (ref 3.4–5)
ALBUMIN/GLOB SERPL: 1 {RATIO} (ref 1–2)
ALP LIVER SERPL-CCNC: 50 U/L
ALT SERPL-CCNC: 23 U/L
ANION GAP SERPL CALC-SCNC: 7 MMOL/L (ref 0–18)
AST SERPL-CCNC: 20 U/L (ref 15–37)
BILIRUB SERPL-MCNC: 0.6 MG/DL (ref 0.1–2)
BUN BLD-MCNC: 13 MG/DL (ref 7–18)
BUN/CREAT SERPL: 17.3 (ref 10–20)
CALCIUM BLD-MCNC: 9.4 MG/DL (ref 8.5–10.1)
CHLORIDE SERPL-SCNC: 108 MMOL/L (ref 98–112)
CHOLEST SERPL-MCNC: 184 MG/DL (ref ?–200)
CO2 SERPL-SCNC: 27 MMOL/L (ref 21–32)
CREAT BLD-MCNC: 0.75 MG/DL
DEPRECATED RDW RBC AUTO: 44.3 FL (ref 35.1–46.3)
ERYTHROCYTE [DISTWIDTH] IN BLOOD BY AUTOMATED COUNT: 13.8 % (ref 11–15)
FASTING PATIENT LIPID ANSWER: YES
FASTING STATUS PATIENT QL REPORTED: YES
GLOBULIN PLAS-MCNC: 3.6 G/DL (ref 2.8–4.4)
GLUCOSE BLD-MCNC: 88 MG/DL (ref 70–99)
HCT VFR BLD AUTO: 40.5 %
HDLC SERPL-MCNC: 93 MG/DL (ref 40–59)
HGB BLD-MCNC: 12.9 G/DL
LDLC SERPL CALC-MCNC: 83 MG/DL (ref ?–100)
MCH RBC QN AUTO: 28.2 PG (ref 26–34)
MCHC RBC AUTO-ENTMCNC: 31.9 G/DL (ref 31–37)
MCV RBC AUTO: 88.6 FL
NONHDLC SERPL-MCNC: 91 MG/DL (ref ?–130)
OSMOLALITY SERPL CALC.SUM OF ELEC: 294 MOSM/KG (ref 275–295)
PLATELET # BLD AUTO: 267 10(3)UL (ref 150–450)
POTASSIUM SERPL-SCNC: 4.7 MMOL/L (ref 3.5–5.1)
PROT SERPL-MCNC: 7.3 G/DL (ref 6.4–8.2)
RBC # BLD AUTO: 4.57 X10(6)UL
SODIUM SERPL-SCNC: 142 MMOL/L (ref 136–145)
TRIGL SERPL-MCNC: 40 MG/DL (ref 30–149)
TSI SER-ACNC: 3.49 MIU/ML (ref 0.36–3.74)
VIT D+METAB SERPL-MCNC: 57 NG/ML (ref 30–100)
VLDLC SERPL CALC-MCNC: 6 MG/DL (ref 0–30)
WBC # BLD AUTO: 5.1 X10(3) UL (ref 4–11)

## 2022-06-24 PROCEDURE — 84443 ASSAY THYROID STIM HORMONE: CPT | Performed by: INTERNAL MEDICINE

## 2022-06-24 PROCEDURE — 85027 COMPLETE CBC AUTOMATED: CPT | Performed by: INTERNAL MEDICINE

## 2022-06-24 PROCEDURE — 80053 COMPREHEN METABOLIC PANEL: CPT | Performed by: INTERNAL MEDICINE

## 2022-06-24 PROCEDURE — 80061 LIPID PANEL: CPT | Performed by: INTERNAL MEDICINE

## 2022-06-24 PROCEDURE — 82306 VITAMIN D 25 HYDROXY: CPT

## 2022-06-24 PROCEDURE — 36415 COLL VENOUS BLD VENIPUNCTURE: CPT | Performed by: INTERNAL MEDICINE

## 2022-08-31 ENCOUNTER — OFFICE VISIT (OUTPATIENT)
Dept: ENDOCRINOLOGY CLINIC | Facility: CLINIC | Age: 72
End: 2022-08-31
Payer: MEDICARE

## 2022-08-31 ENCOUNTER — LAB ENCOUNTER (OUTPATIENT)
Dept: LAB | Facility: HOSPITAL | Age: 72
End: 2022-08-31
Attending: INTERNAL MEDICINE
Payer: MEDICARE

## 2022-08-31 VITALS
DIASTOLIC BLOOD PRESSURE: 76 MMHG | WEIGHT: 133 LBS | BODY MASS INDEX: 24 KG/M2 | HEART RATE: 53 BPM | SYSTOLIC BLOOD PRESSURE: 134 MMHG

## 2022-08-31 DIAGNOSIS — E55.9 VITAMIN D DEFICIENCY: ICD-10-CM

## 2022-08-31 DIAGNOSIS — E04.1 THYROID NODULE: Primary | ICD-10-CM

## 2022-08-31 DIAGNOSIS — Z84.89 FAMILY HISTORY OF BENIGN PARATHYROID TUMOR: ICD-10-CM

## 2022-08-31 DIAGNOSIS — E28.39 ESTROGEN DEFICIENCY: ICD-10-CM

## 2022-08-31 LAB
CALCIUM BLD-MCNC: 9.3 MG/DL (ref 8.5–10.1)
CREAT BLD-MCNC: 0.74 MG/DL
PHOSPHATE SERPL-MCNC: 3.3 MG/DL (ref 2.5–4.9)
PTH-INTACT SERPL-MCNC: 61.1 PG/ML (ref 18.5–88)
VIT D+METAB SERPL-MCNC: 41.5 NG/ML (ref 30–100)

## 2022-08-31 PROCEDURE — 84100 ASSAY OF PHOSPHORUS: CPT | Performed by: INTERNAL MEDICINE

## 2022-08-31 PROCEDURE — 99203 OFFICE O/P NEW LOW 30 MIN: CPT | Performed by: INTERNAL MEDICINE

## 2022-08-31 PROCEDURE — 82310 ASSAY OF CALCIUM: CPT | Performed by: INTERNAL MEDICINE

## 2022-08-31 PROCEDURE — 36415 COLL VENOUS BLD VENIPUNCTURE: CPT | Performed by: INTERNAL MEDICINE

## 2022-08-31 PROCEDURE — 82306 VITAMIN D 25 HYDROXY: CPT | Performed by: INTERNAL MEDICINE

## 2022-08-31 PROCEDURE — 82565 ASSAY OF CREATININE: CPT | Performed by: INTERNAL MEDICINE

## 2022-08-31 PROCEDURE — 3075F SYST BP GE 130 - 139MM HG: CPT | Performed by: INTERNAL MEDICINE

## 2022-08-31 PROCEDURE — 83970 ASSAY OF PARATHORMONE: CPT | Performed by: INTERNAL MEDICINE

## 2022-08-31 PROCEDURE — 3078F DIAST BP <80 MM HG: CPT | Performed by: INTERNAL MEDICINE

## 2022-09-01 ENCOUNTER — TELEPHONE (OUTPATIENT)
Dept: ENDOCRINOLOGY CLINIC | Facility: CLINIC | Age: 72
End: 2022-09-01

## 2022-09-02 NOTE — TELEPHONE ENCOUNTER
Spoke to patient regarding Dr. Surya Barber result notes below. Patient verbalized understanding. Did not have any additional questions at this time.

## 2022-10-13 ENCOUNTER — OFFICE VISIT (OUTPATIENT)
Dept: OPHTHALMOLOGY | Facility: CLINIC | Age: 72
End: 2022-10-13
Payer: MEDICARE

## 2022-10-13 DIAGNOSIS — H02.886 MEIBOMIAN GLAND DYSFUNCTION (MGD) OF BOTH EYES: ICD-10-CM

## 2022-10-13 DIAGNOSIS — H43.393 FLOATERS, BILATERAL: ICD-10-CM

## 2022-10-13 DIAGNOSIS — H02.883 MEIBOMIAN GLAND DYSFUNCTION (MGD) OF BOTH EYES: ICD-10-CM

## 2022-10-13 DIAGNOSIS — Z96.1 PSEUDOPHAKIA OF BOTH EYES: Primary | ICD-10-CM

## 2022-10-13 DIAGNOSIS — H26.493 AFTER CATARACT NOT OBSCURING VISION, BILATERAL: ICD-10-CM

## 2022-10-13 PROCEDURE — 92014 COMPRE OPH EXAM EST PT 1/>: CPT | Performed by: OPHTHALMOLOGY

## 2022-10-13 PROCEDURE — 1126F AMNT PAIN NOTED NONE PRSNT: CPT | Performed by: OPHTHALMOLOGY

## 2022-10-13 NOTE — PATIENT INSTRUCTIONS
Pseudophakia of both eyes  No treatment. Ok to continue with OTC reading glasses. Floaters, bilateral   There is no evidence of retinal pathology. All signs and symptoms of retinal detachment/tears explained in detail. Patient instructed to call the office if they experience increase in floaters, increase in flashes of light, loss of vision or curtain or veil effect. After cataract not obscuring vision, bilateral  Will continue to watch. Meibomian gland dysfunction (MGD) of both eyes  Patient was instructed to use warm compresses to the eyelids twice a day everyday. Instructions for warm compress use:   Patient should place wash compresses on both eyelids for 5 minutes every morning and every night. After 5 minutes of holding the warm compresses on the eyelids, patient should gently rub the eyelashes and then rinse thoroughly with warm water.
+ RLQ tenderness; + periumbilical pain; abdomen soft, no guarding.

## 2022-11-28 ENCOUNTER — HOSPITAL ENCOUNTER (OUTPATIENT)
Dept: ULTRASOUND IMAGING | Facility: HOSPITAL | Age: 72
Discharge: HOME | End: 2022-11-28
Attending: INTERNAL MEDICINE
Payer: MEDICARE

## 2022-11-28 DIAGNOSIS — E04.1 THYROID NODULE: ICD-10-CM

## 2022-11-28 PROCEDURE — 76536 US EXAM OF HEAD AND NECK: CPT | Performed by: INTERNAL MEDICINE

## 2022-12-05 ENCOUNTER — TELEPHONE (OUTPATIENT)
Dept: ENDOCRINOLOGY CLINIC | Facility: CLINIC | Age: 72
End: 2022-12-05

## 2022-12-05 ENCOUNTER — HOSPITAL ENCOUNTER (OUTPATIENT)
Dept: BONE DENSITY | Facility: HOSPITAL | Age: 72
Discharge: HOME | End: 2022-12-05
Attending: INTERNAL MEDICINE
Payer: MEDICARE

## 2022-12-05 DIAGNOSIS — E28.39 ESTROGEN DEFICIENCY: ICD-10-CM

## 2022-12-05 PROCEDURE — 77080 DXA BONE DENSITY AXIAL: CPT | Performed by: INTERNAL MEDICINE

## 2022-12-09 ENCOUNTER — TELEPHONE (OUTPATIENT)
Dept: ENDOCRINOLOGY CLINIC | Facility: CLINIC | Age: 72
End: 2022-12-09

## 2022-12-10 NOTE — TELEPHONE ENCOUNTER
Bone density test shows osteopenia. There has been some improvement in bone density and 10 year fracture risk does not warrant treatment at this time  Will continue to monitor, fall precautions.  Thanks

## 2023-05-08 ENCOUNTER — ORDER TRANSCRIPTION (OUTPATIENT)
Dept: ADMINISTRATIVE | Facility: HOSPITAL | Age: 73
End: 2023-05-08

## 2023-05-08 DIAGNOSIS — Z12.31 ENCOUNTER FOR SCREENING MAMMOGRAM FOR MALIGNANT NEOPLASM OF BREAST: Primary | ICD-10-CM

## 2023-06-07 ENCOUNTER — HOSPITAL ENCOUNTER (OUTPATIENT)
Dept: MAMMOGRAPHY | Facility: HOSPITAL | Age: 73
Discharge: HOME | End: 2023-06-07
Attending: INTERNAL MEDICINE
Payer: MEDICARE

## 2023-06-07 DIAGNOSIS — Z12.31 ENCOUNTER FOR SCREENING MAMMOGRAM FOR MALIGNANT NEOPLASM OF BREAST: ICD-10-CM

## 2023-06-07 PROCEDURE — 77067 SCR MAMMO BI INCL CAD: CPT | Performed by: INTERNAL MEDICINE

## 2023-06-07 PROCEDURE — 77063 BREAST TOMOSYNTHESIS BI: CPT | Performed by: INTERNAL MEDICINE

## 2023-07-05 ENCOUNTER — OFFICE VISIT (OUTPATIENT)
Dept: INTERNAL MEDICINE CLINIC | Facility: CLINIC | Age: 73
End: 2023-07-05

## 2023-07-05 VITALS
BODY MASS INDEX: 23.85 KG/M2 | HEART RATE: 82 BPM | DIASTOLIC BLOOD PRESSURE: 76 MMHG | SYSTOLIC BLOOD PRESSURE: 123 MMHG | HEIGHT: 62 IN | WEIGHT: 129.63 LBS

## 2023-07-05 DIAGNOSIS — M85.80 OSTEOPENIA, UNSPECIFIED LOCATION: ICD-10-CM

## 2023-07-05 DIAGNOSIS — Z86.010 HX OF ADENOMATOUS COLONIC POLYPS: ICD-10-CM

## 2023-07-05 DIAGNOSIS — Z00.00 ENCOUNTER FOR ANNUAL HEALTH EXAMINATION: ICD-10-CM

## 2023-07-05 DIAGNOSIS — M79.605 LEFT LEG PAIN: ICD-10-CM

## 2023-07-05 DIAGNOSIS — I70.0 AORTIC ATHEROSCLEROSIS (HCC): ICD-10-CM

## 2023-07-05 DIAGNOSIS — Z00.00 ANNUAL PHYSICAL EXAM: Primary | ICD-10-CM

## 2023-07-05 PROCEDURE — 1170F FXNL STATUS ASSESSED: CPT | Performed by: INTERNAL MEDICINE

## 2023-07-05 PROCEDURE — 3008F BODY MASS INDEX DOCD: CPT | Performed by: INTERNAL MEDICINE

## 2023-07-05 PROCEDURE — 3074F SYST BP LT 130 MM HG: CPT | Performed by: INTERNAL MEDICINE

## 2023-07-05 PROCEDURE — G0439 PPPS, SUBSEQ VISIT: HCPCS | Performed by: INTERNAL MEDICINE

## 2023-07-05 PROCEDURE — 3078F DIAST BP <80 MM HG: CPT | Performed by: INTERNAL MEDICINE

## 2023-07-05 PROCEDURE — 1126F AMNT PAIN NOTED NONE PRSNT: CPT | Performed by: INTERNAL MEDICINE

## 2023-07-05 PROCEDURE — 96160 PT-FOCUSED HLTH RISK ASSMT: CPT | Performed by: INTERNAL MEDICINE

## 2023-07-05 PROCEDURE — 99213 OFFICE O/P EST LOW 20 MIN: CPT | Performed by: INTERNAL MEDICINE

## 2023-07-05 PROCEDURE — 1159F MED LIST DOCD IN RCRD: CPT | Performed by: INTERNAL MEDICINE

## 2023-07-05 PROCEDURE — 1160F RVW MEDS BY RX/DR IN RCRD: CPT | Performed by: INTERNAL MEDICINE

## 2023-08-19 ENCOUNTER — LAB ENCOUNTER (OUTPATIENT)
Dept: LAB | Facility: HOSPITAL | Age: 73
End: 2023-08-19
Attending: INTERNAL MEDICINE
Payer: MEDICARE

## 2023-08-19 DIAGNOSIS — Z00.00 ANNUAL PHYSICAL EXAM: ICD-10-CM

## 2023-08-19 LAB
ALBUMIN SERPL-MCNC: 3.6 G/DL (ref 3.4–5)
ALBUMIN/GLOB SERPL: 1 {RATIO} (ref 1–2)
ALP LIVER SERPL-CCNC: 55 U/L
ALT SERPL-CCNC: 16 U/L
ANION GAP SERPL CALC-SCNC: 4 MMOL/L (ref 0–18)
AST SERPL-CCNC: 12 U/L (ref 15–37)
BILIRUB SERPL-MCNC: 0.4 MG/DL (ref 0.1–2)
BUN BLD-MCNC: 10 MG/DL (ref 7–18)
BUN/CREAT SERPL: 13.2 (ref 10–20)
CALCIUM BLD-MCNC: 9.5 MG/DL (ref 8.5–10.1)
CHLORIDE SERPL-SCNC: 110 MMOL/L (ref 98–112)
CHOLEST SERPL-MCNC: 189 MG/DL (ref ?–200)
CO2 SERPL-SCNC: 28 MMOL/L (ref 21–32)
CREAT BLD-MCNC: 0.76 MG/DL
DEPRECATED RDW RBC AUTO: 43.5 FL (ref 35.1–46.3)
EGFRCR SERPLBLD CKD-EPI 2021: 82 ML/MIN/1.73M2 (ref 60–?)
ERYTHROCYTE [DISTWIDTH] IN BLOOD BY AUTOMATED COUNT: 13.5 % (ref 11–15)
FASTING PATIENT LIPID ANSWER: YES
FASTING STATUS PATIENT QL REPORTED: YES
GLOBULIN PLAS-MCNC: 3.5 G/DL (ref 2.8–4.4)
GLUCOSE BLD-MCNC: 85 MG/DL (ref 70–99)
HCT VFR BLD AUTO: 41.8 %
HDLC SERPL-MCNC: 96 MG/DL (ref 40–59)
HGB BLD-MCNC: 13.4 G/DL
LDLC SERPL CALC-MCNC: 84 MG/DL (ref ?–100)
MCH RBC QN AUTO: 28.1 PG (ref 26–34)
MCHC RBC AUTO-ENTMCNC: 32.1 G/DL (ref 31–37)
MCV RBC AUTO: 87.6 FL
NONHDLC SERPL-MCNC: 93 MG/DL (ref ?–130)
OSMOLALITY SERPL CALC.SUM OF ELEC: 292 MOSM/KG (ref 275–295)
PLATELET # BLD AUTO: 266 10(3)UL (ref 150–450)
POTASSIUM SERPL-SCNC: 4.9 MMOL/L (ref 3.5–5.1)
PROT SERPL-MCNC: 7.1 G/DL (ref 6.4–8.2)
RBC # BLD AUTO: 4.77 X10(6)UL
SODIUM SERPL-SCNC: 142 MMOL/L (ref 136–145)
TRIGL SERPL-MCNC: 45 MG/DL (ref 30–149)
VIT D+METAB SERPL-MCNC: 35.2 NG/ML (ref 30–100)
VLDLC SERPL CALC-MCNC: 7 MG/DL (ref 0–30)
WBC # BLD AUTO: 7 X10(3) UL (ref 4–11)

## 2023-08-19 PROCEDURE — 80053 COMPREHEN METABOLIC PANEL: CPT | Performed by: INTERNAL MEDICINE

## 2023-08-19 PROCEDURE — 80061 LIPID PANEL: CPT | Performed by: INTERNAL MEDICINE

## 2023-08-19 PROCEDURE — 82306 VITAMIN D 25 HYDROXY: CPT

## 2023-08-19 PROCEDURE — 85027 COMPLETE CBC AUTOMATED: CPT | Performed by: INTERNAL MEDICINE

## 2023-08-19 PROCEDURE — 36415 COLL VENOUS BLD VENIPUNCTURE: CPT | Performed by: INTERNAL MEDICINE

## 2024-07-05 ENCOUNTER — OFFICE VISIT (OUTPATIENT)
Dept: INTERNAL MEDICINE CLINIC | Facility: CLINIC | Age: 74
End: 2024-07-05

## 2024-07-05 VITALS
SYSTOLIC BLOOD PRESSURE: 135 MMHG | BODY MASS INDEX: 24.22 KG/M2 | HEART RATE: 61 BPM | WEIGHT: 131.63 LBS | HEIGHT: 62 IN | DIASTOLIC BLOOD PRESSURE: 77 MMHG

## 2024-07-05 DIAGNOSIS — Z98.42 HX OF BILATERAL CATARACT EXTRACTION: ICD-10-CM

## 2024-07-05 DIAGNOSIS — I70.0 AORTIC ATHEROSCLEROSIS (HCC): ICD-10-CM

## 2024-07-05 DIAGNOSIS — Z12.31 BREAST CANCER SCREENING BY MAMMOGRAM: ICD-10-CM

## 2024-07-05 DIAGNOSIS — M85.80 OSTEOPENIA, UNSPECIFIED LOCATION: ICD-10-CM

## 2024-07-05 DIAGNOSIS — Z00.00 ENCOUNTER FOR ANNUAL HEALTH EXAMINATION: ICD-10-CM

## 2024-07-05 DIAGNOSIS — Z00.00 ANNUAL PHYSICAL EXAM: Primary | ICD-10-CM

## 2024-07-05 DIAGNOSIS — Z98.41 HX OF BILATERAL CATARACT EXTRACTION: ICD-10-CM

## 2024-07-05 DIAGNOSIS — Z86.010 HX OF ADENOMATOUS COLONIC POLYPS: ICD-10-CM

## 2024-07-05 NOTE — PATIENT INSTRUCTIONS
Your physical today was normal  Please come in soon for blood tests when you can, and also schedule a mammogram  Continue healthy diet and regular exercise

## 2024-07-05 NOTE — PROGRESS NOTES
Subjective:   Ria Rios is a 74 year old female who presents this morning for a MA AHA (Medicare Advantage Annual Health Assessment) and scheduled follow up of multiple significant but stable problems as outlined below    Her last visit here was for a Medicare physical July 2023.  She has had no interval medical problems since that visit.  She feels well.  No particular issues at all for discussion today.    In terms of her osteopenia, diet healthy.  She exercises 5 days weekly, Debbie, walking and weights.  Weight has been stable.  In terms of her prior history of colon polyps, she is up-to-date on colonoscopy and is asymptomatic.  Next colonoscopy due September 2028.  Declines all routine immunizations.    History/Other:   Fall Risk Assessment:   She has been screened for Falls and is low risk.      Cognitive Assessment:   She had a completely normal cognitive assessment - see flowsheet entries     Functional Ability/Status:   Ria Rios has a completely normal functional assessment. See flowsheet for details.      Depression Screening (PHQ):  PHQ-2 SCORE: 0  , done 7/5/2024   Last [place] Suicide Screening on 7/5/2024 was No Risk.     Advanced Directives:   She does NOT have a Living Will. [Do you have a living will?: No]  She does NOT have a Power of  for Health Care. [Do you have a healthcare power of ?: No]  Not discussed      Patient Active Problem List   Diagnosis    Thyroid nodule    Bradycardia    Meibomian gland dysfunction (MGD) of both eyes    Age-related nuclear cataract of both eyes    Dry eyes    Floaters, bilateral    Adenomatous polyp of sigmoid colon    Localized edema    Pain in limb    Varicose veins of lower extremity with inflammation    Tendinopathy of rotator cuff, left    Pain due to varicose veins of lower extremity    Aortic atherosclerosis (HCC)    Hx of adenomatous colonic polyps    Osteopenia    Pseudophakia of both eyes    After cataract not obscuring  vision, bilateral     Allergies:  She is allergic to ibuprofen and ibuprofen.    Current Medications:  No outpatient medications have been marked as taking for the 7/5/24 encounter (Office Visit) with Vipul Lemon MD.       Medical History:  She  has a past medical history of Aortic atherosclerosis (HCC), adenomatous colonic polyps, Osteopenia, and Thyroid nodule.  Surgical History:  She  has a past surgical history that includes colonoscopy (2011); colonoscopy (N/A, 10/20/2017); Cataract extraction w/  intraocular lens implant (Right, 07/08/2021); Cataract extraction w/  intraocular lens implant (Left, 07/01/2021); colonoscopy (N/A, 9/10/2021); and tonsillectomy (1969).   Family History:  Her family history includes Colon Cancer (age of onset: 62) in her sister; Hypertension in her sister; No Known Problems in her mother; Other in her father; Skin cancer in her brother.  Social History:  She  reports that she has never smoked. She has never used smokeless tobacco. She reports that she does not currently use alcohol. She reports that she does not use drugs.    Tobacco:  She has never smoked tobacco.    CAGE Alcohol Screen:   CAGE screening score of 0 on 7/5/2024, showing low risk of alcohol abuse.      Patient Care Team:  Albino Collier MD as PCP - General (Family Medicine)  Co, Larissa MARADIAGA MD (Internal Medicine)  Carter Landis DO (Physical Medicine)    Review of Systems    REVIEW OF SYSTEMS:   GENERAL: No fever  LUNGS: No cough wheezing or shortness of breath  CARDIAC: No lightheadedness palpitations or chest pain  GI: Occasional abdominal gas.  No anorexia heartburn dysphagia nausea vomiting abdominal pain diarrhea constipation or rectal bleeding  : No urinary frequency dysuria or hematuria  MUSCULOSKELETAL: No leg swelling  NEURO: No headaches     Objective:   Physical Exam    EXAM:   GENERAL: Pleasant female appearing well in no distress  /77 (BP Location: Left arm, Patient Position: Sitting, Cuff  Size: adult)   Pulse 61   Ht 5' 2\" (1.575 m)   Wt 131 lb 9.6 oz (59.7 kg)   BMI 24.07 kg/m²   HEENT: Anicteric, conjunctiva pink, oropharynx normal  NECK: Supple without mass or thyromegaly  NODES: No peripheral adenopathy  LUNGS: Resonant to percussion and clear to auscultation  CARDIAC: Rhythm regular S1 S2 normal without murmur or edema  ABDOMEN: Bowel sounds normal soft nontender without mass or hepatosplenomegaly  EXTREMITIES: Normal without cyanosis or clubbing  PULSES: 2+ bilateral dorsalis pedis and posterior tibial  NEURO: Reflexes 2+ bilaterally and symmetric     /77 (BP Location: Left arm, Patient Position: Sitting, Cuff Size: adult)   Pulse 61   Ht 5' 2\" (1.575 m)   Wt 131 lb 9.6 oz (59.7 kg)   BMI 24.07 kg/m²  Estimated body mass index is 24.07 kg/m² as calculated from the following:    Height as of this encounter: 5' 2\" (1.575 m).    Weight as of this encounter: 131 lb 9.6 oz (59.7 kg).    Medicare Hearing Assessment:   Hearing Screening    Time taken: 7/5/2024  9:04 AM  Entry User: Zenia Mane LPN  Screening Method: Finger Rub  Finger Rub Result: Pass               Assessment & Plan:   Ria Rios is a 74 year old female who presents for a Medicare Assessment.     1. Annual physical exam (Primary)  Again discussed routine immunizations but she declines  Check CMP CBC and lipid profile.  Order sent  Discussed screening mammogram.  She is agreeable.  Order sent  Reinforced healthy diet and regular exercise, maintaining current body weight  Annual Medicare physical.  -     Comp Metabolic Panel (14)  -     CBC, Platelet; No Differential  -     Lipid Panel  -     [facility] ZAHRAA 2D+3D SCREENING BILAT (CPT=77067/11577); Future; Expected date: 07/05/2024    2. Hx of adenomatous colonic polyps  Up-to-date on colonoscopy    3. Osteopenia, unspecified location  Continue weightbearing exercise and calcium/vitamin D supplementation  Overview:  DEXA 11-19 with T score -1.7 hip and -1.0 lumbar  spine    4. Aortic atherosclerosis (HCC)  Asymptomatic.  Risk factor control  Overview:  CXR 12-17    5. Breast cancer screening by mammogram  Await mammogram to be scheduled  -     [facility] ZAHRAA 2D+3D SCREENING BILAT (CPT=77067/04684); Future; Expected date: 07/05/2024    The patient indicates understanding of these issues and agrees to the plan.  Reinforced healthy diet, lifestyle, and exercise.      No follow-ups on file.     Vipul Lemon MD, 7/5/2024     Supplementary Documentation:   General Health:  In the past six months, have you lost more than 10 pounds without trying?: 2 - No  Has your appetite been poor?: No  Type of Diet: Balanced  How does the patient maintain a good energy level?: Appropriate Exercise  How would you describe your daily physical activity?: Heavy  How would you describe your current health state?: Good  How do you maintain positive mental well-being?: Visiting Friends;Visiting Family  On a scale of 0 to 10, with 0 being no pain and 10 being severe pain, what is your pain level?: 0 - (None)  In the past six months, have you experienced urine leakage?: 0-No  At any time do you feel concerned for the safety/well-being of yourself and/or your children, in your home or elsewhere?: No  Have you had any immunizations at another office such as Influenza, Hepatitis B, Tetanus, or Pneumococcal?: No    Health Maintenance   Topic Date Due    Zoster Vaccines (1 of 2) Never done    Pneumococcal Vaccine: 65+ Years (1 of 1 - PCV) Never done    COVID-19 Vaccine (2 - 2023-24 season) 09/01/2023    Influenza Vaccine (1) 10/01/2024    Colorectal Cancer Screening  09/10/2028    DEXA Scan  Completed    MA Annual Health Assessment  Completed    Annual Depression Screening  Completed    Fall Risk Screening (Annual)  Completed    Mammogram  Discontinued

## 2024-07-08 ENCOUNTER — LAB ENCOUNTER (OUTPATIENT)
Dept: LAB | Facility: HOSPITAL | Age: 74
End: 2024-07-08
Attending: INTERNAL MEDICINE
Payer: MEDICARE

## 2024-07-08 LAB
ALBUMIN SERPL-MCNC: 4.2 G/DL (ref 3.2–4.8)
ALBUMIN/GLOB SERPL: 1.6 {RATIO} (ref 1–2)
ALP LIVER SERPL-CCNC: 53 U/L
ALT SERPL-CCNC: 9 U/L
ANION GAP SERPL CALC-SCNC: 4 MMOL/L (ref 0–18)
AST SERPL-CCNC: 15 U/L (ref ?–34)
BILIRUB SERPL-MCNC: 0.8 MG/DL (ref 0.2–1.1)
BUN BLD-MCNC: 13 MG/DL (ref 9–23)
BUN/CREAT SERPL: 16.5 (ref 10–20)
CALCIUM BLD-MCNC: 9.8 MG/DL (ref 8.7–10.4)
CHLORIDE SERPL-SCNC: 108 MMOL/L (ref 98–112)
CHOLEST SERPL-MCNC: 206 MG/DL (ref ?–200)
CO2 SERPL-SCNC: 30 MMOL/L (ref 21–32)
CREAT BLD-MCNC: 0.79 MG/DL
DEPRECATED RDW RBC AUTO: 43.7 FL (ref 35.1–46.3)
EGFRCR SERPLBLD CKD-EPI 2021: 78 ML/MIN/1.73M2 (ref 60–?)
ERYTHROCYTE [DISTWIDTH] IN BLOOD BY AUTOMATED COUNT: 13.8 % (ref 11–15)
FASTING PATIENT LIPID ANSWER: YES
FASTING STATUS PATIENT QL REPORTED: YES
GLOBULIN PLAS-MCNC: 2.7 G/DL (ref 2–3.5)
GLUCOSE BLD-MCNC: 95 MG/DL (ref 70–99)
HCT VFR BLD AUTO: 39 %
HDLC SERPL-MCNC: 86 MG/DL (ref 40–59)
HGB BLD-MCNC: 13.2 G/DL
LDLC SERPL CALC-MCNC: 111 MG/DL (ref ?–100)
MCH RBC QN AUTO: 29.5 PG (ref 26–34)
MCHC RBC AUTO-ENTMCNC: 33.8 G/DL (ref 31–37)
MCV RBC AUTO: 87.1 FL
NONHDLC SERPL-MCNC: 120 MG/DL (ref ?–130)
OSMOLALITY SERPL CALC.SUM OF ELEC: 294 MOSM/KG (ref 275–295)
PLATELET # BLD AUTO: 252 10(3)UL (ref 150–450)
POTASSIUM SERPL-SCNC: 4.6 MMOL/L (ref 3.5–5.1)
PROT SERPL-MCNC: 6.9 G/DL (ref 5.7–8.2)
RBC # BLD AUTO: 4.48 X10(6)UL
SODIUM SERPL-SCNC: 142 MMOL/L (ref 136–145)
TRIGL SERPL-MCNC: 50 MG/DL (ref 30–149)
VLDLC SERPL CALC-MCNC: 8 MG/DL (ref 0–30)
WBC # BLD AUTO: 5.6 X10(3) UL (ref 4–11)

## 2024-07-08 PROCEDURE — 85027 COMPLETE CBC AUTOMATED: CPT | Performed by: INTERNAL MEDICINE

## 2024-07-08 PROCEDURE — 80061 LIPID PANEL: CPT | Performed by: INTERNAL MEDICINE

## 2024-07-08 PROCEDURE — 80053 COMPREHEN METABOLIC PANEL: CPT | Performed by: INTERNAL MEDICINE

## 2024-07-08 PROCEDURE — 36415 COLL VENOUS BLD VENIPUNCTURE: CPT | Performed by: INTERNAL MEDICINE

## 2024-07-19 ENCOUNTER — TELEPHONE (OUTPATIENT)
Dept: INTERNAL MEDICINE CLINIC | Facility: CLINIC | Age: 74
End: 2024-07-19

## 2024-07-19 DIAGNOSIS — E55.9 VITAMIN D DEFICIENCY: Primary | ICD-10-CM

## 2024-07-19 NOTE — TELEPHONE ENCOUNTER
Patient calling for results on blood testing from 7/8/24. Advised the following:      CMP normal.  CBC normal.  Cholesterol 206 triglycerides 50 HDL 86 .     Patient requesting an order for Vitamin D blood test    See pended if approved

## 2024-07-19 NOTE — TELEPHONE ENCOUNTER
Advised patient of Dr Lemon's note. Patient verbalized understanding and had no further questions.

## 2024-08-03 ENCOUNTER — LAB ENCOUNTER (OUTPATIENT)
Dept: LAB | Facility: HOSPITAL | Age: 74
End: 2024-08-03
Attending: INTERNAL MEDICINE
Payer: MEDICARE

## 2024-08-03 LAB — VIT D+METAB SERPL-MCNC: 45.8 NG/ML (ref 30–100)

## 2024-08-03 PROCEDURE — 82306 VITAMIN D 25 HYDROXY: CPT | Performed by: INTERNAL MEDICINE

## 2024-08-03 PROCEDURE — 36415 COLL VENOUS BLD VENIPUNCTURE: CPT | Performed by: INTERNAL MEDICINE

## 2024-08-09 ENCOUNTER — TELEPHONE (OUTPATIENT)
Dept: INTERNAL MEDICINE CLINIC | Facility: CLINIC | Age: 74
End: 2024-08-09

## 2024-08-09 DIAGNOSIS — D22.9 SKIN MOLE: Primary | ICD-10-CM

## 2024-08-09 NOTE — TELEPHONE ENCOUNTER
Patient requests referral to dermatology for evaluation some moles on her face she is concerned about.    Pended for review

## 2024-08-09 NOTE — TELEPHONE ENCOUNTER
Patient contacted (name and  of patient verified). Dr. Lemon's message reviewed. Patient verbalizes understanding; denies further questions and agrees with plan of care.  Would like to schedule 6-month follow up appointment for January. Unable to schedule as Dr. Lemon's schedule is not open yet for 2025. Advised patient to call back to schedule follow up appointment in a month or two, verbalizes understanding. She states she will request blood test order at time of appointment.

## 2024-08-09 NOTE — TELEPHONE ENCOUNTER
Spoke to patient and relayed Dr. Lemon's message below. Patient verbalized understanding and had no further questions.

## 2024-08-09 NOTE — TELEPHONE ENCOUNTER
Patient states she is working on diet and exercise to lower cholesterol levels and would like to recheck lipid panel.  Patient asking when she should recheck.  Please advise.

## 2024-08-09 NOTE — TELEPHONE ENCOUNTER
Patient calling for Vitamin D results.     Reviewed results and patient verbalizes understanding.                  Vipul Lemon MD  8/3/2024  2:31 PM CDT Back to Top      Vitamin D normal at 45.8.

## 2024-09-04 ENCOUNTER — OFFICE VISIT (OUTPATIENT)
Dept: DERMATOLOGY CLINIC | Facility: CLINIC | Age: 74
End: 2024-09-04

## 2024-09-04 DIAGNOSIS — L81.4 LENTIGINES: ICD-10-CM

## 2024-09-04 DIAGNOSIS — L82.1 SEBORRHEIC KERATOSES: Primary | ICD-10-CM

## 2024-09-04 DIAGNOSIS — D18.01 CHERRY ANGIOMA: ICD-10-CM

## 2024-09-04 DIAGNOSIS — D22.9 MULTIPLE BENIGN NEVI: ICD-10-CM

## 2024-09-04 PROCEDURE — 1160F RVW MEDS BY RX/DR IN RCRD: CPT | Performed by: STUDENT IN AN ORGANIZED HEALTH CARE EDUCATION/TRAINING PROGRAM

## 2024-09-04 PROCEDURE — 1159F MED LIST DOCD IN RCRD: CPT | Performed by: STUDENT IN AN ORGANIZED HEALTH CARE EDUCATION/TRAINING PROGRAM

## 2024-09-04 PROCEDURE — 99203 OFFICE O/P NEW LOW 30 MIN: CPT | Performed by: STUDENT IN AN ORGANIZED HEALTH CARE EDUCATION/TRAINING PROGRAM

## 2024-09-04 NOTE — PROGRESS NOTES
September 4, 2024    New patient     Referred by: Vipul Lemon MD     CHIEF COMPLAINT: FBSE    HISTORY OF PRESENT ILLNESS: .    1. Growth   Location:  Under Right eye   Duration: 1 month ago (resolved)  Signs and symptoms: Red bump x 1 week but resolved with treatment, some redness remaining in area  Current treatment: None   Past treatments: Hydrocortisone     DERM HISTORY:  History of skin cancer: No    FAMILY HISTORY:  History of melanoma: No , NMSC (Sister)    PAST MEDICAL HISTORY:  Past Medical History:    Aortic atherosclerosis (HCC)    CXR 12-17    Hx of adenomatous colonic polyps    Osteopenia    DEXA 11-19 with T score -1.7 hip and -1.0 lumbar spine; DEXA 12-22 with T score -1.2 hip and -1.5 lumbar spine    Thyroid nodule    Thyroid ultrasound 11-22 with bilateral thyroid nodules not meeting TI-RADS criteria for biopsy or follow-up       REVIEW OF SYSTEMS:  Constitutional: Denies fever, chills, unintentional weight loss.   Skin as per HPI    Medications  Current Outpatient Medications   Medication Sig Dispense Refill    Ascorbic Acid (VITAMIN C) 100 MG Oral Tab Take 1 tablet (100 mg total) by mouth daily. (Patient not taking: Reported on 7/5/2024)      Multiple Vitamin (DAILY MIAN) Oral Tab Take 1 tablet by mouth as needed. (Patient not taking: Reported on 9/4/2024)      Calcium Carbonate Antacid 1000 MG Oral Chew Tab Chew 1,000 mg by mouth as needed.   (Patient not taking: Reported on 7/5/2024)         PHYSICAL EXAM:  Patient declined chaperone   General: awake, alert, no acute distress  Skin: Skin exam was performed today including the following: head and face, scalp, neck, chest (including breasts and axillae), abdomen, back, bilateral upper extremities, bilateral lower extremities, hands, feet, digits, nails. Pertinent findings include:   - Scattered bright red-purple dome-shaped papules on the trunk and extremities   - Scattered light brown stellate macules on sun exposed sites  - Scattered, evenly  colored, round brown macules and papules with regular borders on the trunk and extremities  - Numerous scattered skin-colored and brown, waxy, stuck-on papules and plaques on the trunk and extremities      ASSESSMENT & PLAN:  Pathophysiology of diagnoses discussed with patient.  Therapeutic options reviewed. Risks, benefits, and alternatives discussed with patient. Instructions reviewed at length.    #Lentigines  #Seborrheic keratoses   #Cherry angiomas   - Reassurance provided regarding the benign nature of these lesions.    #Multiple benign nevi  - Complete skin exam performed today with no outlier lesions identified   - Reassured patient of benign nature of these lesions.   - Recommend daily photoprotection with broad-spectrum sunscreen, avoidance of sun during peak hours, and sun protective clothing.    - Dermoscopy was used for physical examination of pigmented lesions during today's office visit.      Return to clinic: 2-3 years  or sooner if something concerning arises     Tim Salmon MD

## 2025-01-15 ENCOUNTER — OFFICE VISIT (OUTPATIENT)
Dept: INTERNAL MEDICINE CLINIC | Facility: CLINIC | Age: 75
End: 2025-01-15

## 2025-01-15 ENCOUNTER — LAB ENCOUNTER (OUTPATIENT)
Dept: LAB | Age: 75
End: 2025-01-15
Attending: INTERNAL MEDICINE
Payer: MEDICARE

## 2025-01-15 VITALS
HEIGHT: 62 IN | WEIGHT: 121.81 LBS | DIASTOLIC BLOOD PRESSURE: 78 MMHG | BODY MASS INDEX: 22.41 KG/M2 | HEART RATE: 71 BPM | OXYGEN SATURATION: 99 % | SYSTOLIC BLOOD PRESSURE: 133 MMHG

## 2025-01-15 DIAGNOSIS — R63.4 UNINTENTIONAL WEIGHT LOSS: ICD-10-CM

## 2025-01-15 DIAGNOSIS — H25.13 AGE-RELATED NUCLEAR CATARACT OF BOTH EYES: ICD-10-CM

## 2025-01-15 DIAGNOSIS — R63.4 UNINTENTIONAL WEIGHT LOSS: Primary | ICD-10-CM

## 2025-01-15 LAB
ALBUMIN SERPL-MCNC: 4.6 G/DL (ref 3.2–4.8)
ALBUMIN/GLOB SERPL: 1.5 {RATIO} (ref 1–2)
ALP LIVER SERPL-CCNC: 59 U/L
ALT SERPL-CCNC: 11 U/L
ANION GAP SERPL CALC-SCNC: 8 MMOL/L (ref 0–18)
AST SERPL-CCNC: 16 U/L (ref ?–34)
BASOPHILS # BLD AUTO: 0.05 X10(3) UL (ref 0–0.2)
BASOPHILS NFR BLD AUTO: 0.6 %
BILIRUB SERPL-MCNC: 0.6 MG/DL (ref 0.2–1.1)
BUN BLD-MCNC: 20 MG/DL (ref 9–23)
BUN/CREAT SERPL: 25 (ref 10–20)
CALCIUM BLD-MCNC: 10.2 MG/DL (ref 8.7–10.4)
CHLORIDE SERPL-SCNC: 106 MMOL/L (ref 98–112)
CO2 SERPL-SCNC: 28 MMOL/L (ref 21–32)
CREAT BLD-MCNC: 0.8 MG/DL
DEPRECATED RDW RBC AUTO: 43.3 FL (ref 35.1–46.3)
EGFRCR SERPLBLD CKD-EPI 2021: 77 ML/MIN/1.73M2 (ref 60–?)
EOSINOPHIL # BLD AUTO: 0.28 X10(3) UL (ref 0–0.7)
EOSINOPHIL NFR BLD AUTO: 3.3 %
ERYTHROCYTE [DISTWIDTH] IN BLOOD BY AUTOMATED COUNT: 13.2 % (ref 11–15)
FASTING STATUS PATIENT QL REPORTED: YES
GLOBULIN PLAS-MCNC: 3 G/DL (ref 2–3.5)
GLUCOSE BLD-MCNC: 104 MG/DL (ref 70–99)
HCT VFR BLD AUTO: 41.6 %
HGB BLD-MCNC: 13.4 G/DL
IMM GRANULOCYTES # BLD AUTO: 0.02 X10(3) UL (ref 0–1)
IMM GRANULOCYTES NFR BLD: 0.2 %
LYMPHOCYTES # BLD AUTO: 1.87 X10(3) UL (ref 1–4)
LYMPHOCYTES NFR BLD AUTO: 22.1 %
MCH RBC QN AUTO: 28.3 PG (ref 26–34)
MCHC RBC AUTO-ENTMCNC: 32.2 G/DL (ref 31–37)
MCV RBC AUTO: 87.9 FL
MONOCYTES # BLD AUTO: 0.82 X10(3) UL (ref 0.1–1)
MONOCYTES NFR BLD AUTO: 9.7 %
NEUTROPHILS # BLD AUTO: 5.42 X10 (3) UL (ref 1.5–7.7)
NEUTROPHILS # BLD AUTO: 5.42 X10(3) UL (ref 1.5–7.7)
NEUTROPHILS NFR BLD AUTO: 64.1 %
OSMOLALITY SERPL CALC.SUM OF ELEC: 297 MOSM/KG (ref 275–295)
PLATELET # BLD AUTO: 318 10(3)UL (ref 150–450)
POTASSIUM SERPL-SCNC: 4.5 MMOL/L (ref 3.5–5.1)
PROT SERPL-MCNC: 7.6 G/DL (ref 5.7–8.2)
RBC # BLD AUTO: 4.73 X10(6)UL
SODIUM SERPL-SCNC: 142 MMOL/L (ref 136–145)
TSI SER-ACNC: 3.45 UIU/ML (ref 0.55–4.78)
WBC # BLD AUTO: 8.5 X10(3) UL (ref 4–11)

## 2025-01-15 PROCEDURE — 99214 OFFICE O/P EST MOD 30 MIN: CPT | Performed by: INTERNAL MEDICINE

## 2025-01-15 PROCEDURE — 3008F BODY MASS INDEX DOCD: CPT | Performed by: INTERNAL MEDICINE

## 2025-01-15 PROCEDURE — 80053 COMPREHEN METABOLIC PANEL: CPT

## 2025-01-15 PROCEDURE — 1160F RVW MEDS BY RX/DR IN RCRD: CPT | Performed by: INTERNAL MEDICINE

## 2025-01-15 PROCEDURE — 1159F MED LIST DOCD IN RCRD: CPT | Performed by: INTERNAL MEDICINE

## 2025-01-15 PROCEDURE — 36415 COLL VENOUS BLD VENIPUNCTURE: CPT

## 2025-01-15 PROCEDURE — 3078F DIAST BP <80 MM HG: CPT | Performed by: INTERNAL MEDICINE

## 2025-01-15 PROCEDURE — 85025 COMPLETE CBC W/AUTO DIFF WBC: CPT

## 2025-01-15 PROCEDURE — 1170F FXNL STATUS ASSESSED: CPT | Performed by: INTERNAL MEDICINE

## 2025-01-15 PROCEDURE — 3075F SYST BP GE 130 - 139MM HG: CPT | Performed by: INTERNAL MEDICINE

## 2025-01-15 PROCEDURE — 84443 ASSAY THYROID STIM HORMONE: CPT

## 2025-01-15 RX ORDER — MAGNESIUM OXIDE 420 MG/1
TABLET ORAL
COMMUNITY

## 2025-01-15 NOTE — PROGRESS NOTES
Ria Rios is a 74 year old female with complaints of:  Chief Complaint: Weight Loss (Lost some weight without trying )    HPI     Ria Rios is a(n) 74 year old female with a history of osteopenia, thyroid nodules, aortic atherosclerosis, varicose veins, who presents for unintentional weight loss.     Patient states that she has lost about 10 lbs in the last 2 months. She has cut back on carbs in her diet, and is eating more protein. She continues to go to the gyn 3-4 times a week. She has also been very stressed with elder care with her mother and other family issues.     Past Medical History     Past Medical History:    Aortic atherosclerosis (HCC)    CXR     Hx of adenomatous colonic polyps    Osteopenia    DEXA  with T score -1.7 hip and -1.0 lumbar spine; DEXA  with T score -1.2 hip and -1.5 lumbar spine    Thyroid nodule    Thyroid ultrasound  with bilateral thyroid nodules not meeting TI-RADS criteria for biopsy or follow-up        Past Surgical History     Past Surgical History:   Procedure Laterality Date    Cataract extraction w/  intraocular lens implant Right 2021    Dr. Nirav Chavez     Cataract extraction w/  intraocular lens implant Left 2021    Dr. Nirav Chavez    Colonoscopy      [facility]    Colonoscopy N/A 10/20/2017    Procedure: COLONOSCOPY;  Surgeon: Cristobal Harrison MD;  Location: [facility] ENDOSCOPY    Colonoscopy N/A 09/10/2021    Procedure: COLONOSCOPY;  Surgeon: Cristobal Harrison MD;  Location: [facility] ENDOSCOPY    Tonsillectomy  1969        Family History     Family History   Problem Relation Age of Onset    Colon Cancer Sister 62         age 64    Other (Other) Father     No Known Problems Mother     Skin cancer Brother     Hypertension Sister     Diabetes Neg     Glaucoma Neg     Macular degeneration Neg     Breast Cancer Neg     Ovarian Cancer Neg        Social History     Social History     Socioeconomic History    Marital status:     Occupational History    Occupation: Home health   Tobacco Use    Smoking status: Never    Smokeless tobacco: Never   Vaping Use    Vaping status: Never Used   Substance and Sexual Activity    Alcohol use: Not Currently     Alcohol/week: 0.0 standard drinks of alcohol     Comment: Infrequent    Drug use: No    Sexual activity: Yes     Partners: Male   Other Topics Concern    Caffeine Concern Yes     Comment: coffee/tea - 2-3 cups daily    Exercise Yes     Comment: every other day - gym    Blood Transfusions No    Reaction to local anesthetic No    Pt has a pacemaker No    Pt has a defibrillator No   Social History Narrative    ** Merged History Encounter **         Live with spouse, feels safe in situation.        The patient does not use an assistive device..      The patient does live in a home with stairs.     Social Drivers of Health      [facility]    Social Connections    Received from [facility], [facility]    Housing Stability       Allergies     Allergies[1]    Current Medications     Current Outpatient Medications   Medication Sig Dispense Refill    Magnesium Oxide 420 MG Oral Tab Take by mouth.      cholecalciferol 1000 UNITS Oral Cap Take 1 capsule (1,000 Units total) by mouth daily.       No current facility-administered medications for this visit.       Review of Systems     GENERAL HEALTH: feels well otherwise  SKIN: denies any unusual skin lesions or rashes  RESPIRATORY: denies shortness of breath with exertion  CARDIOVASCULAR: denies chest pain on exertion  GI: denies abdominal pain and denies heartburn  : denies any burning with urination, urinary frequency or urgency  NEURO: denies headaches, numbness or tingling, mental status changes  PSYCH: denies depressed mood, anxiety  MUSC: denies muscle aches, joint pain    Physical Exam     /78 (BP Location: Right arm, Patient Position:  Sitting, Cuff Size: adult)   Pulse 71   Ht 5' 2\" (1.575 m)   Wt 121 lb 12.8 oz (55.2 kg)   SpO2 99%   BMI 22.28 kg/m²     GENERAL: well developed, well nourished,in no apparent distress  SKIN: no rashes,no suspicious lesions  HEENT: atraumatic, normocephalic,ears and throat are clear  NECK: supple,no adenopathy,no bruits  LUNGS: clear to auscultation  CARDIO: RRR without murmur  GI: good BS's,no masses, HSM or tenderness  EXTREMITIES: no cyanosis, clubbing or edema    Assessment and Plan     Diagnoses and all orders for this visit:    Unintentional weight loss. Check CBC, CMP, lipids. Likely weight loss is multi-factorial, from dietary changes, stress, and age. Check labs to r/o organic cause. Re-introduce carbs into the diet. Monitor weight for the next month, and RTO if continuing to lose weight. UTD on colon. Needs to do mammo. No localizing symptoms.   -     TSH W Reflex To Free T4 [E]; Future  -     Comp Metabolic Panel (14) [E]; Future  -     CBC W Differential W Platelet [E]; Future    Age-related nuclear cataract of both eyes  -     OPHTHALMOLOGY - INTERNAL          Magnesium Oxide 420 MG Oral Tab Take by mouth.      cholecalciferol 1000 UNITS Oral Cap Take 1 capsule (1,000 Units total) by mouth daily.         Requested Prescriptions      No prescriptions requested or ordered in this encounter       Orders Placed This Encounter   Procedures    TSH W Reflex To Free T4 [E]     Standing Status:   Future     Standing Expiration Date:   1/15/2026     Order Specific Question:   Release to patient     Answer:   Immediate    Comp Metabolic Panel (14) [E]     Standing Status:   Future     Standing Expiration Date:   1/15/2026     Order Specific Question:   Release to patient     Answer:   Immediate    CBC W Differential W Platelet [E]     Standing Status:   Future     Standing Expiration Date:   1/15/2026     Order Specific Question:   Release to patient     Answer:   Immediate       No follow-ups on  file.    The patient indicates understanding of these issues and agrees to the plan.    Electronically signed by Saranya Olvera MD 01/15/25             [1]   Allergies  Allergen Reactions    Ibuprofen      Other reaction(s): facial numbness    Ibuprofen OTHER (SEE COMMENTS)     Pt states facial numbness.

## 2025-06-25 PROBLEM — I83.813 PAIN DUE TO VARICOSE VEINS OF BOTH LOWER EXTREMITIES: Status: ACTIVE | Noted: 2025-06-25

## 2025-07-01 ENCOUNTER — OFFICE VISIT (OUTPATIENT)
Dept: INTERNAL MEDICINE CLINIC | Facility: CLINIC | Age: 75
End: 2025-07-01

## 2025-07-01 VITALS
SYSTOLIC BLOOD PRESSURE: 124 MMHG | OXYGEN SATURATION: 98 % | BODY MASS INDEX: 23.32 KG/M2 | WEIGHT: 126.69 LBS | HEART RATE: 72 BPM | TEMPERATURE: 98 F | DIASTOLIC BLOOD PRESSURE: 76 MMHG | HEIGHT: 62 IN

## 2025-07-01 DIAGNOSIS — Z00.00 ENCOUNTER FOR GENERAL ADULT MEDICAL EXAMINATION WITHOUT ABNORMAL FINDINGS: Primary | ICD-10-CM

## 2025-07-01 DIAGNOSIS — H25.13 AGE-RELATED NUCLEAR CATARACT OF BOTH EYES: ICD-10-CM

## 2025-07-01 DIAGNOSIS — M85.89 OSTEOPENIA OF MULTIPLE SITES: ICD-10-CM

## 2025-07-01 DIAGNOSIS — Z12.4 ENCOUNTER FOR SCREENING FOR CERVICAL CANCER: ICD-10-CM

## 2025-07-01 DIAGNOSIS — R73.9 HYPERGLYCEMIA: ICD-10-CM

## 2025-07-01 DIAGNOSIS — M85.80 OSTEOPENIA, UNSPECIFIED LOCATION: ICD-10-CM

## 2025-07-01 DIAGNOSIS — Z78.0 ENCOUNTER FOR OSTEOPOROSIS SCREENING IN ASYMPTOMATIC POSTMENOPAUSAL PATIENT: ICD-10-CM

## 2025-07-01 DIAGNOSIS — Z12.31 ENCOUNTER FOR SCREENING MAMMOGRAM FOR BREAST CANCER: ICD-10-CM

## 2025-07-01 DIAGNOSIS — Z12.11 ENCOUNTER FOR SCREENING COLONOSCOPY: ICD-10-CM

## 2025-07-01 DIAGNOSIS — Z13.820 ENCOUNTER FOR OSTEOPOROSIS SCREENING IN ASYMPTOMATIC POSTMENOPAUSAL PATIENT: ICD-10-CM

## 2025-07-01 PROCEDURE — 1126F AMNT PAIN NOTED NONE PRSNT: CPT | Performed by: INTERNAL MEDICINE

## 2025-07-01 PROCEDURE — 1159F MED LIST DOCD IN RCRD: CPT | Performed by: INTERNAL MEDICINE

## 2025-07-01 PROCEDURE — 1160F RVW MEDS BY RX/DR IN RCRD: CPT | Performed by: INTERNAL MEDICINE

## 2025-07-01 PROCEDURE — 3074F SYST BP LT 130 MM HG: CPT | Performed by: INTERNAL MEDICINE

## 2025-07-01 PROCEDURE — 1170F FXNL STATUS ASSESSED: CPT | Performed by: INTERNAL MEDICINE

## 2025-07-01 PROCEDURE — 99214 OFFICE O/P EST MOD 30 MIN: CPT | Performed by: INTERNAL MEDICINE

## 2025-07-01 PROCEDURE — G0439 PPPS, SUBSEQ VISIT: HCPCS | Performed by: INTERNAL MEDICINE

## 2025-07-01 PROCEDURE — 3078F DIAST BP <80 MM HG: CPT | Performed by: INTERNAL MEDICINE

## 2025-07-01 PROCEDURE — 96160 PT-FOCUSED HLTH RISK ASSMT: CPT | Performed by: INTERNAL MEDICINE

## 2025-07-01 PROCEDURE — 3008F BODY MASS INDEX DOCD: CPT | Performed by: INTERNAL MEDICINE

## 2025-07-01 NOTE — PROGRESS NOTES
REASON FOR VISIT      Ria Rios is a 75 year old female who presents for  MA AHA (Medicare Advantage Annual Health Assessment) and Subsequent Annual Wellness visit (Pt already had Initial Annual Wellness) and scheduled follow up of multiple significant but stable problems.     HCM   - mammogram: Last done 6/7/2023, did not due in 2024, so repeat ordered today for 2025.  - colon: Up-to-date, next due 9/10/2028  - PAP: Aged out  - DEXA: Last DEXA scan done 12/5/2022.  T-scores in the left femoral neck, left total hip, and lumbar spine were -1.2-0.4, and -1.5 respectively.  The 10-year fracture risk for major osteoporotic fracture was 5.7%, and for hip fracture was 0.9%.  This was somewhat improved from prior.  - Labs: CBC, CMP, TSH done earlier, needs lipids, vitamin D, A1c.  - imm: Flu shot? COVID booster? Shingles? PNA?  Needs flu shot in the fall, needs tetanus shot, needs pneumonia, needs shingles, needs RSV  - depression: neg     Osteopenia.  DEXA scan as above.    Thyroid nodules.  Patient is following with endocrinology regarding this.  She last saw the endocrinologist, Dr. Bueno, and 8/31/2022.  She had an ultrasound of the thyroid done 11/28/2022, that did show multiple nodules, all less than 1 cm, and none reaching TI-RADS criteria for biopsy or follow-up.    Aortic atherosclerosis.  Noted to have aortic atherosclerosis at some point on previous imaging.  No angina or equivalent today.    Weight loss has stabilized.  When patient saw me in January 2025, she was significant about unintentional weight loss.  She attributed that at the time for a lot of caregiver stress related to caring for her 108-year-old mother.  However now, she has been able to hire caregivers, and her sister is helping, so she has seen a significant reduction in stress, with a commensurate stabilization and weight. She experiences some tiredness and mild depression due to her caregiving duties, which involve waking up  multiple times at night to assist her mother. Despite this, she feels healthier overall and engages in regular exercise four to five times a week.      Past Medical History     Past Medical History[1]     Past Surgical History     Past Surgical History[2]     Family History     Family History[3]    Social History     Short Social Hx on File[4]    Tobacco:   She currently has tobacco smoking, smokeless, or e-cigarette status listed as never assessed or unknown.    Please update the information in the Tobacco history section to reflect the true status, and refresh this smartlink.    CAGE Alcohol Screen:   Cage screening not needed because reported NO to Alcohol use on social history.    Depression Screening (PHQ):  PHQ-2/9 not done in last week, please ask questions. Last done              CARLOS-7 Total Score                 Allergies     Allergies[5]    Current Medications     Current Outpatient Medications   Medication Sig Dispense Refill    Magnesium Oxide 420 MG Oral Tab Take by mouth. (Patient not taking: Reported on 7/1/2025)      cholecalciferol 1000 UNITS Oral Cap Take 1 capsule (1,000 Units total) by mouth daily.       No current facility-administered medications for this visit.       Screenings     History/Other:   Fall Risk Assessment:    (Incomplete)        Cognitive Assessment:    (Incomplete)  Tip  Cognitive assessment incomplete. Refresh to ensure screening pulls in; if not,click link above to assess, then refresh:[id number]}      Functional Ability/Status:    (Incomplete)      Immunization History   Administered Date(s) Administered    Covid-19 Vaccine [employer] (J&J) 0.5ml 05/24/2021    TDAP 01/01/2011   Deferred Date(s) Deferred    Pneumococcal (Prevnar 13) 07/11/2017, 04/02/2019       Health Maintenance   Topic Date Due    Pneumococcal Vaccine: 50+ Years (1 of 1 - PCV) Never done    Zoster Vaccines (1 of 2) Never done    COVID-19 Vaccine (2 - 2024-25 season) 09/01/2024    Annual Well Visit  01/01/2025     Annual Depression Screening  01/01/2025    Influenza Vaccine (1) 10/01/2025    Colorectal Cancer Screening  09/10/2028    DEXA Scan  Completed    Fall Risk Screening (Annual)  Completed    Meningococcal B Vaccine  Aged Out    Mammogram  Discontinued         Review of Systems     GENERAL HEALTH: feels well otherwise  SKIN: denies any unusual skin lesions or rashes  RESPIRATORY: denies shortness of breath with exertion  CARDIOVASCULAR: denies chest pain on exertion  GI: denies abdominal pain and denies heartburn  : denies any burning with urination, urinary frequency or urgency  NEURO: denies headaches, numbness or tingling, mental status changes  PSYCH: denies depressed mood, anxiety  MUSC: denies muscle aches, joint pain      Physical Exam     EXAM:  There were no vitals taken for this visit.  >   BP Readings from Last 3 Encounters:   01/15/25 133/78   07/05/24 135/77   07/05/23 123/76     No LMP recorded. (Menstrual status: Menopause).  GENERAL: well developed, well nourished, in no apparent distress  SKIN: no rashes, no suspicious lesions  HEENT: atraumatic, normocephalic, ears and throat are clear  EYES:PERRLA, EOMI, conjunctiva are clear.   NECK: supple, no adenopathy, no bruits  CHEST: no chest tenderness  BREAST: deferred   LUNGS: clear to auscultation  CARDIO: RRR without murmur  GI: good BS's, no masses, HSM or tenderness  :deferred  RECTAL: deferred  MUSCULOSKELETAL: back is not tender, FROM of the back  EXTREMITIES: no cyanosis, clubbing or edema  NEURO: Oriented times three, cranial nerves are intact, motor and sensory are grossly intact  FOOT: normal exam      Assessment and Plan     Ria Rios is a 75 year old female who presents for an Annual Health Assessment.     Assessment & Plan  Encounter for general adult medical examination without abnormal findings  Age appropriate screenings and vaccinations discussed. Counseled on healthy diet, exercise, sleep hygiene. Patient advised that any  additional concerns not included in a routine physical may result in additional charges and/or a copay. Patient verbally acknowledged this information and agreed to proceed.  Patient continues to refuse all vaccines, will continue to address this in the future.   Orders:    Lipid Panel; Future    Hemoglobin A1C; Future    Vitamin D; Future    OPHTHALMOLOGY - INTERNAL    [facility] ZAHRAA 2D+3D SCREENING BILAT (CPT=77067/52702); Future    Encounter for screening colonoscopy  UTD, next due 2028       Encounter for screening mammogram for breast cancer  Ordered today.   Orders:    [facility] ZAHRAA 2D+3D SCREENING BILAT (CPT=77067/07225); Future    Encounter for screening for cervical cancer  No longer doing routine cervical cancer screening due to age, which is appropriate. Continue to monitor for vaginal and vulvar symptoms.       Osteopenia, unspecified location  Encounter for osteoporosis screening in asymptomatic postmenopausal patient  Osteopenia of multiple sites  Ordered today.  Last done 2022 with osteopenia and overall low fracture risk. Patient continues to take vitamin D and engages in weight bearing exercise.     Orders:    Vitamin D; Future    XR DEXA BONE DENSITOMETRY (CPT=77080); Future    Age-related nuclear cataract of both eyes  Optho referral given.   Orders:    OPHTHALMOLOGY - INTERNAL    Hyperglycemia  Screen for DM.   Orders:    Hemoglobin A1C; Future         The patient indicates understanding of these issues and agrees to the plan.  The patient is asked to return in 1 year for AHA  Diet counseling perfomed  Exercise counseling perfomed    Electronically signed by Saranya Olvera MD 07/01/25       [1]   Past Medical History:   Aortic atherosclerosis    CXR 12-17    Hx of adenomatous colonic polyps    Osteopenia    DEXA 11-19 with T score -1.7 hip and -1.0 lumbar spine; DEXA 12-22 with T score -1.2 hip and -1.5 lumbar spine    Thyroid nodule    Thyroid ultrasound 11-22 with bilateral thyroid nodules not meeting TI-RADS  criteria for biopsy or follow-up   [2]   Past Surgical History:  Procedure Laterality Date    Cataract extraction w/  intraocular lens implant Right 2021    Dr. Nirav Chavez     Cataract extraction w/  intraocular lens implant Left 2021    Dr. Nirav Chavez    Colonoscopy      [facility]    Colonoscopy N/A 10/20/2017    Procedure: COLONOSCOPY;  Surgeon: Cristobal Harrison MD;  Location: [facility] ENDOSCOPY    Colonoscopy N/A 09/10/2021    Procedure: COLONOSCOPY;  Surgeon: Cristobal Harrison MD;  Location: [facility] ENDOSCOPY    Tonsillectomy     [3]   Family History  Problem Relation Age of Onset    Colon Cancer Sister 62         age 64    Other (Other) Father     No Known Problems Mother     Skin cancer Brother     Hypertension Sister     Diabetes Neg     Glaucoma Neg     Macular degeneration Neg     Breast Cancer Neg     Ovarian Cancer Neg    [4]   Social History  Socioeconomic History    Marital status:    Occupational History    Occupation: Home health   Tobacco Use    Smoking status: Never    Smokeless tobacco: Never   Vaping Use    Vaping status: Never Used   Substance and Sexual Activity    Alcohol use: Not Currently     Alcohol/week: 0.0 standard drinks of alcohol     Comment: Infrequent    Drug use: No    Sexual activity: Yes     Partners: Male   Other Topics Concern    Caffeine Concern Yes     Comment: coffee/tea - 2-3 cups daily    Exercise Yes     Comment: every other day - gym    Blood Transfusions No    Reaction to local anesthetic No    Pt has a pacemaker No    Pt has a defibrillator No   Social History Narrative    ** Merged History Encounter **         Live with spouse, feels safe in situation.        The patient does not use an assistive device..      The patient does live in a home with stairs.     Social Drivers of Health      Received from [facility]    Housing Stability   [5]   Allergies  Allergen Reactions    Ibuprofen      Other reaction(s): facial  numbness    Ibuprofen OTHER (SEE COMMENTS)     Pt states facial numbness.

## 2025-07-01 NOTE — ASSESSMENT & PLAN NOTE
Ordered today.  Last done 2022 with osteopenia and overall low fracture risk. Patient continues to take vitamin D and engages in weight bearing exercise.     Orders:    Vitamin D; Future    XR DEXA BONE DENSITOMETRY (CPT=77080); Future

## 2025-07-01 NOTE — PROGRESS NOTES
The following individual(s) verbally consented to be recorded using ambient AI listening technology and understand that they can each withdraw their consent to this listening technology at any point by asking the clinician to turn off or pause the recording:     Patient name: Ria Rios   Additional names: None

## (undated) DIAGNOSIS — G89.29 CHRONIC LEFT SHOULDER PAIN: Primary | ICD-10-CM

## (undated) DIAGNOSIS — M25.512 CHRONIC LEFT SHOULDER PAIN: Primary | ICD-10-CM

## (undated) DEVICE — FORCEP RADIAL JAW 4

## (undated) DEVICE — ENDOSCOPY PACK - LOWER: Brand: MEDLINE INDUSTRIES, INC.

## (undated) DEVICE — MEDI-VAC NON-CONDUCTIVE SUCTION TUBING 6MM X 1.8M (6FT.) L: Brand: CARDINAL HEALTH

## (undated) DEVICE — 35 ML SYRINGE REGULAR TIP: Brand: MONOJECT

## (undated) DEVICE — SNARE ENDOSCOPIC 10MM ROUND

## (undated) DEVICE — Device: Brand: CUSTOM PROCEDURE KIT

## (undated) DEVICE — Device: Brand: DEFENDO AIR/WATER/SUCTION AND BIOPSY VALVE

## (undated) DEVICE — SNARE OPTMZ PLPCTM TRP

## (undated) DEVICE — SNARE CAPTIFLEX MICRO-OVL OLY

## (undated) DEVICE — LINE MNTR ADLT SET O2 INTMD

## (undated) NOTE — LETTER
December 20, 2018    Gm Ordaz MD  [address] [facility], [address] [facility]     Patient: Mayra Momin   YOB: 1950   Date of Visit: 12/20/2018       Dear Dr. Loida Cooper MD:    Thank you for referring Kingmonique Mehtaphill to Multiple Vitamin (DAILY MIAN) Oral Tab Take 1 tablet by mouth daily. Disp:  Rfl:    Calcium Carbonate Antacid 1000 MG Oral Chew Tab Chew 1,000 mg by mouth daily.  Disp:  Rfl:        Allergies:    Ibuprofen                   Comment:Other reaction(s): facial Right +2.75 Sphere     Left +2.75 Sphere     Type:  OTC reading only          Manifest Refraction       Sphere Cylinder Dist [place] Add [place]    Right +0.50 Sphere 20/25 +2.75 20/20    Left [place] +0.25 20/40 +2.75 20/20          Final Rx       Sphere Cylind

## (undated) NOTE — LETTER
Date: 12/8/2017    Patient Name: Ata Butler          To Whom it may concern:      [place] is advised not to travel on 12/13/2017 due to her  medical illness. Please give kind consideration. Do not hesitate to call me for any questions.

## (undated) NOTE — LETTER
8/20/2020    [address] [place] [id number]            Dear Grayson Villarreal,      Our records indicate that you are due for an appointment for a Colonoscopy with Arielle Wilkerson MD.    Please call our office to schedule th

## (undated) NOTE — LETTER
January 14, 2020    Lendel Skiff, MD  [address] [place] [address]     Patient: Yessy Phoenix   YOB: 1950   Date of Visit: 1/14/2020       Dear Dr. Reshma Mares MD:    Thank you for referring Qian Askew to me for evalu Current Outpatient Medications   Medication Sig Dispense Refill   • Saline (AYR NASAL MIST ALLERGY/SINUS) 2.65 % Nasal Solution 1 spray by Nasal route 3 (three) times daily.  50 mL 0   • DENAVIR 1 % External Cream      • Ascorbic Acid (VITAMIN C) 500 MG Ora Fundus Exam       Right Left    Disc Good rim, Temporal crescent Good rim, Temporal crescent    C/D Ratio 0.5 0.5    Macula Normal- few drusen temp to disc  Normal- few drusen below fovea     Vessels Normal Normal    Periphery Normal Normal            Ref Return in about 1 year (around 1/14/2021) for Complete eye exam.    1/14/2020  Scribed by: Yasmin Koroma MD      If you have questions, please do not hesitate to call me. I look forward to following Deysi Taylor along with you.     Sincerely,        [place] Hayes

## (undated) NOTE — LETTER
December 5, 2017    bAena Merlos MD  [address]     Patient: Faviola Agee   YOB: 1950   Date of Visit: 12/5/2017       Dear Dr. Sharon Guerrero MD:    Thank you for referring Jesus Alberto Patrick to me for evalu Respiratory, Psychiatric, Allergic/Imm, Heme/Lymph    Last edited by Jhonny England O.T. on 12/5/2017 10:17 AM. (History)          PHYSICAL EXAM:     Base Eye Exam     Visual Acuity (Snellen - Linear)       Right Left    Dist cc 20/40 20/60    Dist ph Age-related nuclear cataract of both eyes  Discussed early cataracts with patient. No treatment recommended at this time. New glasses today for bifocals, or suggest +3.00  over the counter glasses for reading. Floaters, bilateral  No treatment.

## (undated) NOTE — LETTER
AUTHORIZATION FOR SURGICAL OPERATION OR OTHER PROCEDURE    1.  I hereby authorize Dr. Siva Aguilera  and the [facility] staff assigned to my case to perform the following operation and/or procedure at the [facility]:    _____left subacromial bursa injection with co Time:  ________ A. M.  P.M.        Patient Name:  ___Gabriel,Maria___________________________________________________  (please print)       Patient signature:  ___________________________________________________             Relationship to Pa

## (undated) NOTE — MR AVS SNAPSHOT
[facility] at [address] [place] [address] Road [id number] [phone]               Thank you for choosing us for your health care visit with [place] [address] NURSE.   We are glad to serve you and happy to provide you

## (undated) NOTE — LETTER
10/24/2017              [address] [place] [phone]         Dear Sweetie Mckeon,    I wanted to get back to you with your colonoscopy results. You had 3 colon polyps removed which were benign.   I would advise a repeat co

## (undated) NOTE — LETTER
FOSTER Notifier: [place]   B. Patient Name: Francine Cohen Identification Number: [id number]      Advance Beneficiary Notice of Noncoverage (ABN)  NOTE:  If Medicare doesn’t pay for D.  Left subacromial bursa injection with corticosteroid  be Medicare. You may ask to be paid now as I am responsible for payment. I cannot appeal if Medicare is not billed. ? OPTION 3. I don’t want the D. item/service listed above.  I understand with this choice  I am not responsible for payment, and I cannot appea